# Patient Record
Sex: MALE | Race: WHITE | NOT HISPANIC OR LATINO
[De-identification: names, ages, dates, MRNs, and addresses within clinical notes are randomized per-mention and may not be internally consistent; named-entity substitution may affect disease eponyms.]

---
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
## 2019-09-19 ENCOUNTER — EMERGENCY (EMERGENCY)
Facility: HOSPITAL | Age: 19
LOS: 0 days | Discharge: HOME | End: 2019-09-19
Attending: EMERGENCY MEDICINE | Admitting: EMERGENCY MEDICINE
Payer: COMMERCIAL

## 2019-09-19 VITALS
TEMPERATURE: 101 F | DIASTOLIC BLOOD PRESSURE: 69 MMHG | RESPIRATION RATE: 18 BRPM | WEIGHT: 210.32 LBS | HEART RATE: 124 BPM | OXYGEN SATURATION: 98 % | SYSTOLIC BLOOD PRESSURE: 127 MMHG

## 2019-09-19 VITALS — TEMPERATURE: 100 F | HEART RATE: 93 BPM

## 2019-09-19 DIAGNOSIS — B34.9 VIRAL INFECTION, UNSPECIFIED: ICD-10-CM

## 2019-09-19 DIAGNOSIS — R50.9 FEVER, UNSPECIFIED: ICD-10-CM

## 2019-09-19 PROCEDURE — 99283 EMERGENCY DEPT VISIT LOW MDM: CPT

## 2019-09-19 RX ORDER — ONDANSETRON 8 MG/1
8 TABLET, FILM COATED ORAL ONCE
Refills: 0 | Status: COMPLETED | OUTPATIENT
Start: 2019-09-19 | End: 2019-09-19

## 2019-09-19 RX ORDER — ACETAMINOPHEN 500 MG
650 TABLET ORAL ONCE
Refills: 0 | Status: COMPLETED | OUTPATIENT
Start: 2019-09-19 | End: 2019-09-19

## 2019-09-19 RX ADMIN — Medication 650 MILLIGRAM(S): at 21:19

## 2019-09-19 RX ADMIN — ONDANSETRON 8 MILLIGRAM(S): 8 TABLET, FILM COATED ORAL at 21:19

## 2019-09-19 NOTE — ED PROVIDER NOTE - OBJECTIVE STATEMENT
19yr male with two days of feeling malaise fever muscle pain had nausea and diarrhea now just no nausea no abd pain no headache no neck pain no urinary symptoms no medical issues except hypothyroidism gets synthroid 100mcg daily last motrin 400mg at 7:30pm

## 2019-09-19 NOTE — ED PROVIDER NOTE - CLINICAL SUMMARY MEDICAL DECISION MAKING FREE TEXT BOX
19yr male with two days of malaise fever nausea no respiratory distress no chest pain got tylenol and zofran vs stable    diagnosis viral syndrome follow up with pmd  ED evaluation and management discussed with the parent of the patient in detail.  Close PMD follow up encouraged.  Strict ED return instructions discussed in detail and parent was given the opportunity to ask any questions about their discharge diagnosis and instructions. Patient parent verbalized understanding.

## 2019-09-19 NOTE — ED PEDIATRIC TRIAGE NOTE - CHIEF COMPLAINT QUOTE
pt c/o fever x2 days   reports chest tightness and intermittent nausea, vomiting x3 yesterday. pt c/o fever and cough x2 days   reports chest tightness and intermittent nausea, vomiting x3 yesterday.

## 2019-09-19 NOTE — ED PEDIATRIC NURSE NOTE - CHIEF COMPLAINT QUOTE
pt c/o fever and cough x2 days   reports chest tightness and intermittent nausea, vomiting x3 yesterday.

## 2019-09-19 NOTE — ED PROVIDER NOTE - PHYSICAL EXAMINATION
VS reviewed, stable.  Gen: interactive, well appearing, no acute distress  HEENT: NC/AT,  right TM  non bulging  left tm,  no evidence of mastoiditis,  moist mucus membranes, pupils equal, responsive, reactive to light and accomodation, no conjunctivitis or scleral icterus; no nasal discharge .   OP no exudates no erythema  Neck: FROM, supple, no cervical LAD  Chest: CTA b/l, no crackles/wheezes, good air entry, no tachypnea or retractions  CV: regular rate and rhythm, no murmurs   Abd: soft, nontender, nondistended, no HSM appreciated, +BS

## 2019-09-19 NOTE — ED PROVIDER NOTE - NS ED ROS FT
Constitutional: (+) fever  Eyes: (-) redness  ENT: (-) ear pain, (-) nasal congestions, (-) sore throat   Cardiovascular: (-) syncope  Respiratory: (-) cough, (-) shortness of breath  Gastrointestinal: (-) vomiting, (-) diarrhea, (-)nausea  Musculoskeletal: (-) neck pain  Integumentary: (-) rash  Neurological: (-) headache  :  (-)dysuria  Allergic/Immunologic: (-) pruritus  +muscle pain

## 2019-09-19 NOTE — ED PROVIDER NOTE - PATIENT PORTAL LINK FT
You can access the FollowMyHealth Patient Portal offered by [facility] by registering at the following website: [email or web address]. By joining [facility]’s FollowMyHealth portal, you will also be able to view your health information using other applications (apps) compatible with our system.

## 2019-09-22 ENCOUNTER — INPATIENT (INPATIENT)
Facility: HOSPITAL | Age: 19
LOS: 1 days | Discharge: HOME | End: 2019-09-24
Attending: INTERNAL MEDICINE | Admitting: INTERNAL MEDICINE
Payer: COMMERCIAL

## 2019-09-22 ENCOUNTER — TRANSCRIPTION ENCOUNTER (OUTPATIENT)
Age: 19
End: 2019-09-22

## 2019-09-22 VITALS
DIASTOLIC BLOOD PRESSURE: 73 MMHG | OXYGEN SATURATION: 98 % | HEART RATE: 112 BPM | SYSTOLIC BLOOD PRESSURE: 138 MMHG | TEMPERATURE: 100 F | WEIGHT: 206.35 LBS | RESPIRATION RATE: 20 BRPM

## 2019-09-22 LAB
ALBUMIN SERPL ELPH-MCNC: 4 G/DL — SIGNIFICANT CHANGE UP (ref 3.5–5.2)
ALP SERPL-CCNC: 79 U/L — SIGNIFICANT CHANGE UP (ref 30–115)
ALT FLD-CCNC: 33 U/L — SIGNIFICANT CHANGE UP (ref 13–38)
ANION GAP SERPL CALC-SCNC: 20 MMOL/L — HIGH (ref 7–14)
APPEARANCE UR: CLEAR — SIGNIFICANT CHANGE UP
AST SERPL-CCNC: 44 U/L — HIGH (ref 13–38)
BACTERIA # UR AUTO: NEGATIVE — SIGNIFICANT CHANGE UP
BASE EXCESS BLDV CALC-SCNC: 1.3 MMOL/L — SIGNIFICANT CHANGE UP (ref -2–2)
BASOPHILS # BLD AUTO: 0.03 K/UL — SIGNIFICANT CHANGE UP (ref 0–0.2)
BASOPHILS NFR BLD AUTO: 0.3 % — SIGNIFICANT CHANGE UP (ref 0–1)
BILIRUB SERPL-MCNC: 1.3 MG/DL — HIGH (ref 0.2–1.2)
BILIRUB UR-MCNC: ABNORMAL
BUN SERPL-MCNC: 14 MG/DL — SIGNIFICANT CHANGE UP (ref 10–20)
CA-I SERPL-SCNC: 1.2 MMOL/L — SIGNIFICANT CHANGE UP (ref 1.12–1.3)
CALCIUM SERPL-MCNC: 9.9 MG/DL — SIGNIFICANT CHANGE UP (ref 8.5–10.1)
CHLORIDE SERPL-SCNC: 94 MMOL/L — LOW (ref 98–110)
CK SERPL-CCNC: 82 U/L — SIGNIFICANT CHANGE UP (ref 0–225)
CO2 SERPL-SCNC: 20 MMOL/L — SIGNIFICANT CHANGE UP (ref 17–32)
COLOR SPEC: ABNORMAL
CREAT SERPL-MCNC: 1.1 MG/DL — SIGNIFICANT CHANGE UP (ref 0.7–1.5)
DIFF PNL FLD: NEGATIVE — SIGNIFICANT CHANGE UP
EOSINOPHIL # BLD AUTO: 0.02 K/UL — SIGNIFICANT CHANGE UP (ref 0–0.7)
EOSINOPHIL NFR BLD AUTO: 0.2 % — SIGNIFICANT CHANGE UP (ref 0–8)
EPI CELLS # UR: 4 /HPF — SIGNIFICANT CHANGE UP (ref 0–5)
GAS PNL BLDV: 136 MMOL/L — SIGNIFICANT CHANGE UP (ref 136–145)
GAS PNL BLDV: SIGNIFICANT CHANGE UP
GLUCOSE SERPL-MCNC: 97 MG/DL — SIGNIFICANT CHANGE UP (ref 70–99)
GLUCOSE UR QL: NEGATIVE — SIGNIFICANT CHANGE UP
HCO3 BLDV-SCNC: 27 MMOL/L — SIGNIFICANT CHANGE UP (ref 22–29)
HCT VFR BLD CALC: 42.8 % — SIGNIFICANT CHANGE UP (ref 42–52)
HCT VFR BLDA CALC: 53.5 % — HIGH (ref 34–44)
HGB BLD CALC-MCNC: 17.5 G/DL — SIGNIFICANT CHANGE UP (ref 14–18)
HGB BLD-MCNC: 15.4 G/DL — SIGNIFICANT CHANGE UP (ref 14–18)
HYALINE CASTS # UR AUTO: 15 /LPF — HIGH (ref 0–7)
IMM GRANULOCYTES NFR BLD AUTO: 0.4 % — HIGH (ref 0.1–0.3)
KETONES UR-MCNC: ABNORMAL
LACTATE BLDV-MCNC: 1.6 MMOL/L — SIGNIFICANT CHANGE UP (ref 0.5–1.6)
LEUKOCYTE ESTERASE UR-ACNC: NEGATIVE — SIGNIFICANT CHANGE UP
LIDOCAIN IGE QN: 9 U/L — SIGNIFICANT CHANGE UP (ref 7–60)
LYMPHOCYTES # BLD AUTO: 0.7 K/UL — LOW (ref 1.2–3.4)
LYMPHOCYTES # BLD AUTO: 6 % — LOW (ref 20.5–51.1)
MCHC RBC-ENTMCNC: 30.7 PG — SIGNIFICANT CHANGE UP (ref 27–31)
MCHC RBC-ENTMCNC: 36 G/DL — SIGNIFICANT CHANGE UP (ref 32–37)
MCV RBC AUTO: 85.3 FL — SIGNIFICANT CHANGE UP (ref 80–94)
MONOCYTES # BLD AUTO: 0.39 K/UL — SIGNIFICANT CHANGE UP (ref 0.1–0.6)
MONOCYTES NFR BLD AUTO: 3.4 % — SIGNIFICANT CHANGE UP (ref 1.7–9.3)
NEUTROPHILS # BLD AUTO: 10.43 K/UL — HIGH (ref 1.4–6.5)
NEUTROPHILS NFR BLD AUTO: 89.7 % — HIGH (ref 42.2–75.2)
NITRITE UR-MCNC: NEGATIVE — SIGNIFICANT CHANGE UP
NRBC # BLD: 0 /100 WBCS — SIGNIFICANT CHANGE UP (ref 0–0)
PCO2 BLDV: 44 MMHG — SIGNIFICANT CHANGE UP (ref 41–51)
PH BLDV: 7.4 — SIGNIFICANT CHANGE UP (ref 7.26–7.43)
PH UR: 6.5 — SIGNIFICANT CHANGE UP (ref 5–8)
PLATELET # BLD AUTO: 271 K/UL — SIGNIFICANT CHANGE UP (ref 130–400)
PO2 BLDV: 25 MMHG — SIGNIFICANT CHANGE UP (ref 20–40)
POTASSIUM BLDV-SCNC: 3.6 MMOL/L — SIGNIFICANT CHANGE UP (ref 3.3–5.6)
POTASSIUM SERPL-MCNC: 4.7 MMOL/L — SIGNIFICANT CHANGE UP (ref 3.5–5)
POTASSIUM SERPL-SCNC: 4.7 MMOL/L — SIGNIFICANT CHANGE UP (ref 3.5–5)
PROT SERPL-MCNC: 7.5 G/DL — SIGNIFICANT CHANGE UP (ref 6–8)
PROT UR-MCNC: ABNORMAL
RBC # BLD: 5.02 M/UL — SIGNIFICANT CHANGE UP (ref 4.7–6.1)
RBC # FLD: 12.2 % — SIGNIFICANT CHANGE UP (ref 11.5–14.5)
RBC CASTS # UR COMP ASSIST: 5 /HPF — HIGH (ref 0–4)
SAO2 % BLDV: 44 % — SIGNIFICANT CHANGE UP
SODIUM SERPL-SCNC: 134 MMOL/L — LOW (ref 135–146)
SP GR SPEC: 1.04 — HIGH (ref 1.01–1.02)
UROBILINOGEN FLD QL: ABNORMAL
WBC # BLD: 11.62 K/UL — HIGH (ref 4.8–10.8)
WBC # FLD AUTO: 11.62 K/UL — HIGH (ref 4.8–10.8)
WBC UR QL: 6 /HPF — HIGH (ref 0–5)

## 2019-09-22 PROCEDURE — 93010 ELECTROCARDIOGRAM REPORT: CPT

## 2019-09-22 PROCEDURE — 99285 EMERGENCY DEPT VISIT HI MDM: CPT | Mod: 25

## 2019-09-22 RX ORDER — ONDANSETRON 8 MG/1
4 TABLET, FILM COATED ORAL ONCE
Refills: 0 | Status: COMPLETED | OUTPATIENT
Start: 2019-09-22 | End: 2019-09-22

## 2019-09-22 RX ORDER — FAMOTIDINE 10 MG/ML
20 INJECTION INTRAVENOUS ONCE
Refills: 0 | Status: COMPLETED | OUTPATIENT
Start: 2019-09-22 | End: 2019-09-22

## 2019-09-22 RX ORDER — ACETAMINOPHEN 500 MG
975 TABLET ORAL ONCE
Refills: 0 | Status: COMPLETED | OUTPATIENT
Start: 2019-09-22 | End: 2019-09-22

## 2019-09-22 RX ORDER — FAMOTIDINE 10 MG/ML
20 INJECTION INTRAVENOUS ONCE
Refills: 0 | Status: DISCONTINUED | OUTPATIENT
Start: 2019-09-22 | End: 2019-09-22

## 2019-09-22 RX ORDER — SODIUM CHLORIDE 9 MG/ML
2000 INJECTION, SOLUTION INTRAVENOUS ONCE
Refills: 0 | Status: COMPLETED | OUTPATIENT
Start: 2019-09-22 | End: 2019-09-22

## 2019-09-22 RX ADMIN — FAMOTIDINE 104 MILLIGRAM(S): 10 INJECTION INTRAVENOUS at 23:26

## 2019-09-22 RX ADMIN — Medication 975 MILLIGRAM(S): at 22:04

## 2019-09-22 RX ADMIN — ONDANSETRON 4 MILLIGRAM(S): 8 TABLET, FILM COATED ORAL at 21:57

## 2019-09-22 RX ADMIN — SODIUM CHLORIDE 2000 MILLILITER(S): 9 INJECTION, SOLUTION INTRAVENOUS at 21:57

## 2019-09-22 RX ADMIN — Medication 30 MILLILITER(S): at 21:59

## 2019-09-22 NOTE — ED PROVIDER NOTE - OBJECTIVE STATEMENT
20 y/o male with pmhx of hypothyroidism presents with fever for 6 days. Patient states he has been having continuous fevers, vomiting, diarrhea, sob, and chest pain for the past 6 days. Also admits to cough and epigastric abdominal pain. Patient was seen in ER couple of days ago, was advised to follow up with PMD. Patient states he saw Dr. Rodriguez yesterday, who gave him zofran and advised him to come to ER for CXR for concerns for vaping related injury. Patient states he has been vaping for many years now, admits to both THC and nicotine vapes everyday. Denies neck pain, altered mental status, mylagias, IVDA, leg swelling, hx of PE/DVT.

## 2019-09-22 NOTE — ED PROVIDER NOTE - PHYSICAL EXAMINATION
CONSTITUTIONAL: Well-developed; well-nourished; diaphoretic appearing young male   SKIN: warm, dry  HEAD: Normocephalic; atraumatic.  EYES: no conj injection  ENT: No nasal discharge; airway clear.  NECK: Supple; non tender.  CARD: S1, S2 normal; no murmurs, gallops, or rubs. Regular rate and rhythm.   RESP: No wheezes, rales or rhonchi. speaking in full sentences, regular respiratory rate   ABD: soft ntnd  EXT: Normal ROM.  No clubbing, cyanosis or edema.   NEURO: Alert, oriented, grossly unremarkable  PSYCH: Cooperative, appropriate.

## 2019-09-22 NOTE — ED PEDIATRIC TRIAGE NOTE - CHIEF COMPLAINT QUOTE
pt was recently seen in ED for vomiting and abdominal pain.   pt returned to ED today for continued vomiting, unable to tolerate PO intake. fevers at home, diaphoresis.

## 2019-09-22 NOTE — ED PROVIDER NOTE - NS ED ROS FT
Constitutional: See HPI.  Eyes: No visual changes, eye pain or discharge.  ENMT: No hearing changes, pain, discharge or infections. No neck pain or stiffness.  Cardiac: + chest pain, SOB; No edema. No chest pain with exertion.  Respiratory: + cough; No respiratory distress.   GI: + nausea, vomiting, diarrhea and abdominal pain.  : No dysuria, frequency or burning.  MS: No myalgia, muscle weakness, joint pain or back pain.  Neuro: No headache or weakness. No LOC.  Skin: No skin rash.  Endo: No hx of DM, thyroid disease  Except as documented in HPI, all other review of systems is negative

## 2019-09-22 NOTE — ED PROVIDER NOTE - CLINICAL SUMMARY MEDICAL DECISION MAKING FREE TEXT BOX
19yoM previously healthy, vapes nicotine and THC regularly x years, presents with cough, fever, and chest heaviness that started 6 days ago and has been present throughout. Chest is worsened by deep inhalations. Associated feeling of nausea and epigastric tenderness that started the next day, with NBNB emesis. Associated nonbloody diarrhea. Denies leg pain or swelling, recent long distance travel, hormone use, hematochezia, dysuria, or any other symptoms. No FHx of cardiac dz or sudden death. On exam, febrile, hemodynamically stable, saturating well on RA, NAD, appears ill but nontoxic, no increased WOB, head NCAT, neck supple, full ROM, EOMI grossly, anicteric, MMM, uvula midline, RRR, nml S1/S2, no m/r/g, lungs CTAB, no w/r/r, abd soft, NT, ND, nml BS, no rebound or guarding or Lisa's, no hepatosplenomegaly, alert, CN's 3-12 grossly intact, PRESSLEY spontaneously, <2 sec cap refill, skin warm, well perfused, no rashes or hives. No e/o fluid overload or signs of myocarditis. No risk factors for PE. Character low suspicion for ACS. Abdomen benign with low suspicion for acute process. Concern for vaping-associated lung injury, confirmed on CT. Hydrated, given abx and prednisone. Patient NAD, hemodynamically stable, no desats or WOB. Admitted for further monitoring, w/u, and care. 19yoM previously healthy, vapes nicotine and THC regularly x years, presents with cough, fever, and chest heaviness that started 6 days ago and has been present throughout. Chest is worsened by deep inhalations. Associated feeling of nausea and epigastric tenderness that started the next day, with NBNB emesis. Associated nonbloody diarrhea. Denies leg pain or swelling, recent long distance travel, hormone use, hematochezia, dysuria, or any other symptoms. No FHx of cardiac dz or sudden death. On exam, febrile, hemodynamically stable, saturating well on RA, NAD, appears ill but nontoxic, no increased WOB, head NCAT, neck supple, full ROM, EOMI grossly, anicteric, MMM, uvula midline, RRR, nml S1/S2, no m/r/g, lungs CTAB, no w/r/r, abd soft, NT, ND, nml BS, no rebound or guarding or Lisa's, no hepatosplenomegaly, alert, CN's 3-12 grossly intact, PRESSLEY spontaneously, <2 sec cap refill, skin warm, well perfused, no rashes or hives. No e/o fluid overload or signs of myocarditis. No risk factors for PE. Character low suspicion for ACS. Abdomen benign with low suspicion for acute process. Concern for vaping-associated lung injury, confirmed on CT. Hydrated, given abx and prednisone. Call out to tox. Patient NAD, hemodynamically stable, no desats or WOB. Admitted for further monitoring, w/u, and care. 19yoM previously healthy, vapes nicotine and THC regularly x years, presents with cough, fever, and chest heaviness that started 6 days ago and has been present throughout. Chest is worsened by deep inhalations. Associated feeling of nausea and epigastric tenderness that started the next day, with NBNB emesis. Associated nonbloody diarrhea. Denies leg pain or swelling, recent long distance travel, hormone use, hematochezia, dysuria, or any other symptoms. No FHx of cardiac dz or sudden death. On exam, febrile, hemodynamically stable, saturating well on RA, NAD, appears ill but nontoxic, no increased WOB, head NCAT, neck supple, full ROM, EOMI grossly, anicteric, MMM, uvula midline, RRR, nml S1/S2, no m/r/g, lungs CTAB, no w/r/r, abd soft, NT, ND, nml BS, no rebound or guarding or Lisa's, no hepatosplenomegaly, alert, CN's 3-12 grossly intact, PRESSLEY spontaneously, <2 sec cap refill, skin warm, well perfused, no rashes or hives. No e/o fluid overload or signs of myocarditis. No risk factors for PE. Character low suspicion for ACS. Abdomen benign with low suspicion for acute process. Concern for vaping-associated lung injury, confirmed on CT. Hydrated, given abx and prednisone. Call out to tox. Patient NAD, hemodynamically stable, no desats or WOB. Sent to obs per tox for further monitoring.

## 2019-09-22 NOTE — ED PROVIDER NOTE - ATTENDING CONTRIBUTION TO CARE
19yoM previously healthy, vapes nicotine and THC regularly x years, presents with cough, fever, and chest heaviness that started 6 days ago and has been present throughout. Chest is worsened by deep inhalations. Associated feeling of nausea and epigastric tenderness that started the next day, with NBNB emesis. Associated nonbloody diarrhea. Denies leg pain or swelling, recent long distance travel, hormone use, hematochezia, dysuria, or any other symptoms. No FHx of cardiac dz or sudden death. On exam, febrile, hemodynamically stable, saturating well on RA, NAD, appears ill but nontoxic, no increased WOB, head NCAT, neck supple, full ROM, EOMI grossly, anicteric, MMM, uvula midline, RRR, nml S1/S2, no m/r/g, lungs CTAB, no w/r/r, abd soft, NT, ND, nml BS, no rebound or guarding or Lisa's, no hepatosplenomegaly, alert, CN's 3-12 grossly intact, PRESSLEY spontaneously, <2 sec cap refill, skin warm, well perfused, no rashes or hives. No e/o fluid overload or signs of myocarditis. No risk factors for PE. Character low suspicion for ACS. Abdomen benign with low suspicion for acute process. Concern for vaping-associated lung injury though no current lung findings or WOB or desats. NAD, hemodynamically stable. Will obtain CT. Hydrated. Signed off care to Dr. SUHAIL Cotton who will f/u CT's and reassess. 19yoM previously healthy, vapes nicotine and THC regularly x years, presents with cough, fever, and chest heaviness that started 6 days ago and has been present throughout. Chest is worsened by deep inhalations. Associated feeling of nausea and epigastric tenderness that started the next day, with NBNB emesis. Associated nonbloody diarrhea. Denies leg pain or swelling, recent long distance travel, hormone use, hematochezia, dysuria, or any other symptoms. No FHx of cardiac dz or sudden death. On exam, febrile, hemodynamically stable, saturating well on RA, NAD, appears ill but nontoxic, no increased WOB, head NCAT, neck supple, full ROM, EOMI grossly, anicteric, MMM, uvula midline, RRR, nml S1/S2, no m/r/g, lungs CTAB, no w/r/r, abd soft, NT, ND, nml BS, no rebound or guarding or Lisa's, no hepatosplenomegaly, alert, CN's 3-12 grossly intact, PRESSLEY spontaneously, <2 sec cap refill, skin warm, well perfused, no rashes or hives. No e/o fluid overload or signs of myocarditis. No risk factors for PE. Character low suspicion for ACS. Abdomen benign with low suspicion for acute process. Concern for vaping-associated lung injury, confirmed on CT. Hydrated, given abx and prednisone. Patient NAD, hemodynamically stable, no desats or WOB. Admitted for further monitoring, w/u, and care. 19yoM previously healthy, vapes nicotine and THC regularly x years, presents with cough, fever, and chest heaviness that started 6 days ago and has been present throughout. Chest is worsened by deep inhalations. Associated feeling of nausea and epigastric tenderness that started the next day, with NBNB emesis. Associated nonbloody diarrhea. Denies leg pain or swelling, recent long distance travel, hormone use, hematochezia, dysuria, or any other symptoms. No FHx of cardiac dz or sudden death. On exam, febrile, hemodynamically stable, saturating well on RA, NAD, appears ill but nontoxic, no increased WOB, head NCAT, neck supple, full ROM, EOMI grossly, anicteric, MMM, uvula midline, RRR, nml S1/S2, no m/r/g, lungs CTAB, no w/r/r, abd soft, NT, ND, nml BS, no rebound or guarding or Lisa's, no hepatosplenomegaly, alert, CN's 3-12 grossly intact, PRESSLEY spontaneously, <2 sec cap refill, skin warm, well perfused, no rashes or hives. No e/o fluid overload or signs of myocarditis. No risk factors for PE. Character low suspicion for ACS. Abdomen benign with low suspicion for acute process. Concern for vaping-associated lung injury, confirmed on CT. Hydrated, given abx and prednisone. Call out to tox. Patient NAD, hemodynamically stable, no desats or WOB. Admitted for further monitoring, w/u, and care. 19yoM previously healthy, vapes nicotine and THC regularly x years, presents with cough, fever, and chest heaviness that started 6 days ago and has been present throughout. Chest is worsened by deep inhalations. Associated feeling of nausea and epigastric tenderness that started the next day, with NBNB emesis. Associated nonbloody diarrhea. Denies leg pain or swelling, recent long distance travel, hormone use, hematochezia, dysuria, or any other symptoms. No FHx of cardiac dz or sudden death. On exam, febrile, hemodynamically stable, saturating well on RA, NAD, appears ill but nontoxic, no increased WOB, head NCAT, neck supple, full ROM, EOMI grossly, anicteric, MMM, uvula midline, RRR, nml S1/S2, no m/r/g, lungs CTAB, no w/r/r, abd soft, NT, ND, nml BS, no rebound or guarding or Lisa's, no hepatosplenomegaly, alert, CN's 3-12 grossly intact, PRESSLEY spontaneously, <2 sec cap refill, skin warm, well perfused, no rashes or hives. No e/o fluid overload or signs of myocarditis. No risk factors for PE. Character low suspicion for ACS. Abdomen benign with low suspicion for acute process. Concern for vaping-associated lung injury, confirmed on CT. Hydrated, given abx and prednisone. Call out to tox. Patient NAD, hemodynamically stable, no desats or WOB. Sent to obs per tox for further monitoring.

## 2019-09-23 LAB — TROPONIN T SERPL-MCNC: <0.01 NG/ML — SIGNIFICANT CHANGE UP

## 2019-09-23 PROCEDURE — 74177 CT ABD & PELVIS W/CONTRAST: CPT | Mod: 26

## 2019-09-23 PROCEDURE — 99285 EMERGENCY DEPT VISIT HI MDM: CPT

## 2019-09-23 PROCEDURE — 71275 CT ANGIOGRAPHY CHEST: CPT | Mod: 26

## 2019-09-23 PROCEDURE — 99234 HOSP IP/OBS SM DT SF/LOW 45: CPT

## 2019-09-23 RX ORDER — ONDANSETRON 8 MG/1
4 TABLET, FILM COATED ORAL ONCE
Refills: 0 | Status: COMPLETED | OUTPATIENT
Start: 2019-09-23 | End: 2019-09-23

## 2019-09-23 RX ORDER — INFLUENZA VIRUS VACCINE 15; 15; 15; 15 UG/.5ML; UG/.5ML; UG/.5ML; UG/.5ML
0.5 SUSPENSION INTRAMUSCULAR ONCE
Refills: 0 | Status: DISCONTINUED | OUTPATIENT
Start: 2019-09-23 | End: 2019-09-24

## 2019-09-23 RX ORDER — SODIUM CHLORIDE 9 MG/ML
1000 INJECTION INTRAMUSCULAR; INTRAVENOUS; SUBCUTANEOUS ONCE
Refills: 0 | Status: COMPLETED | OUTPATIENT
Start: 2019-09-23 | End: 2019-09-23

## 2019-09-23 RX ORDER — AZITHROMYCIN 500 MG/1
500 TABLET, FILM COATED ORAL ONCE
Refills: 0 | Status: COMPLETED | OUTPATIENT
Start: 2019-09-23 | End: 2019-09-23

## 2019-09-23 RX ORDER — SODIUM CHLORIDE 9 MG/ML
1000 INJECTION, SOLUTION INTRAVENOUS ONCE
Refills: 0 | Status: COMPLETED | OUTPATIENT
Start: 2019-09-23 | End: 2019-09-23

## 2019-09-23 RX ORDER — CEFTRIAXONE 500 MG/1
1000 INJECTION, POWDER, FOR SOLUTION INTRAMUSCULAR; INTRAVENOUS ONCE
Refills: 0 | Status: COMPLETED | OUTPATIENT
Start: 2019-09-23 | End: 2019-09-23

## 2019-09-23 RX ORDER — PANTOPRAZOLE SODIUM 20 MG/1
40 TABLET, DELAYED RELEASE ORAL ONCE
Refills: 0 | Status: COMPLETED | OUTPATIENT
Start: 2019-09-23 | End: 2019-09-23

## 2019-09-23 RX ADMIN — AZITHROMYCIN 255 MILLIGRAM(S): 500 TABLET, FILM COATED ORAL at 01:17

## 2019-09-23 RX ADMIN — ONDANSETRON 4 MILLIGRAM(S): 8 TABLET, FILM COATED ORAL at 08:54

## 2019-09-23 RX ADMIN — Medication 125 MILLIGRAM(S): at 08:54

## 2019-09-23 RX ADMIN — PANTOPRAZOLE SODIUM 40 MILLIGRAM(S): 20 TABLET, DELAYED RELEASE ORAL at 10:54

## 2019-09-23 RX ADMIN — Medication 60 MILLIGRAM(S): at 01:19

## 2019-09-23 RX ADMIN — CEFTRIAXONE 100 MILLIGRAM(S): 500 INJECTION, POWDER, FOR SOLUTION INTRAMUSCULAR; INTRAVENOUS at 01:18

## 2019-09-23 RX ADMIN — SODIUM CHLORIDE 500 MILLILITER(S): 9 INJECTION INTRAMUSCULAR; INTRAVENOUS; SUBCUTANEOUS at 02:01

## 2019-09-23 NOTE — ED CDU PROVIDER INITIAL DAY NOTE - MEDICAL DECISION MAKING DETAILS
Seen and examined on AM rounds.  No acute distress.  Afebrile, non toxic.  Spoke with Toxicology attending.  Will continue steroids, observation for desaturation and Pulmonary Consult.  Pulmonary fellow aware.

## 2019-09-23 NOTE — ED CDU PROVIDER INITIAL DAY NOTE - PHYSICAL EXAMINATION
Constitutional: Well developed, well nourished. NAD  Head: Normocephalic, atraumatic.  Eyes: PERRL, EOMI.  ENT: No nasal discharge. Mucous membranes dry.  Neck: Supple. Painless ROM.  Cardiovascular:  Regular rate and rhythm. + tachycardia  Pulmonary: Lungs clear to auscultation bilaterally.   Abdominal: Soft. Nondistended. No rebound, guarding, rigidity.  Extremities. Pelvis stable. No lower extremity edema, symmetric calves.  Skin: No rashes, cyanosis.  Neuro: AAOx3. No focal neurological deficits.  Psych: Normal mood. Normal affect.

## 2019-09-23 NOTE — ED CDU PROVIDER INITIAL DAY NOTE - OBJECTIVE STATEMENT
19 yold male to ED Pmhx Hypothyroidism c/o n/v/diarrhea, cough, pleuritic cp, fever tmax 102 x 1 week ago; pt admits to using nicotine and THC vape pens - Last used 5 days ago; pt seen here initially dx viral syndrome; pt seen by pmd instructed to return to Ed if sx not improved for possible vape injury; ct done in ED showed ground glass opacities bilat - Pneumonia v. vaping lung inj; case d/w tox who recommend obs stay; pt given abx, steroids and Iv fluids

## 2019-09-23 NOTE — CONSULT NOTE ADULT - ASSESSMENT
Impression:    Vaping-induced lung injury      Plan:  Check rvp/urine strept/urine legionella  oxygen prn  IV hydration  no abx for now  steroid 60 mg q24  Gi ppx  check pulse ox on exertion  incentive spiromtery  DVT ppx: ambulation Impression:    highly Vaping-induced lung injury  doubt bacterial pneumonia      Plan:    Check rvp/urine strept/urine legionella  procalcitonin  oxygen prn  IV hydration  levaquin 500 q 24h will dc if procalcitonin sl  solumedrol 60 mg q 12  Gi ppx  check pulse ox on exertion  admit for monitoring repeat CXR in am

## 2019-09-23 NOTE — ED CDU PROVIDER INITIAL DAY NOTE - PROGRESS NOTE DETAILS
received signout from Dr. Landis for tox observation - possible vapeing injury; plan: iv abx, steroids, Iv fluids;  received call from RN for IV fluid and antibiotic incompatibility as per pharmacy; no visible precipitation in IV tubing; LR discontinued and pt given NS bolus; case d/w Tox; pt neuro/vascular intact; no worsening sob/wheezing; will continue to monitor; pt seen bedside, NAD, no complaints feeling better, symptoms improved from steroids. pt will be given po steroids and possibly dispo'd home. pulm consult placed, pulmonary team wants to keep patient overnight to further observe and see the most recent labs that were resent out.  Pulmonary team will re-evaluate patient in the AM .will continue to monitor patient.

## 2019-09-23 NOTE — ED PEDIATRIC NURSE NOTE - ED STAT RN HANDOFF DETAILS
pt resting comfortably, denies any discomfort. no distress noted at this time. iv intact, safety maintained, VSS. awaiting additional testing.

## 2019-09-23 NOTE — CONSULT NOTE ADULT - SUBJECTIVE AND OBJECTIVE BOX
Time:19 @ 06:25  [facility] ED  Emergent/Routine    Chief Complaint:    History of Present Illness:  19yMale      Past Medical History:  ^FEVER/VOMITTING  MEWS Score  No pertinent past medical history  Multifocal pneumonia  FEVER/VOMITTING  2  Acute lung injury        Home Medications:      Active Medications:  MEDICATIONS  (STANDING):  ondansetron Injectable 4 milliGRAM(s) IV Push once    MEDICATIONS  (PRN):        Social History:  Tobacco:     EtOH:     Illicit Substances:       Family History:  ???    Review of Systems:  General:   []Fever  []Chills  []Malaise  [Inc/Dec] Appetite  [Inc/Dec] Weight]  Eyes:   []Vision  [][place]  []Pain  []Redness  []Itching  []Diplopia  ENT:   []Hearing  []Tinnitus  []Epistaxis  []Dysphagia  []Sore Throat  CV:   []Chest pain  []Palpitations  []Syncope  []Edema  Resp:   []Dyspnea  []Cough  []Wheezing  []Stridor  []Hemoptysis  GI:   []Pain  []Nausea  []Vomiting  []Diarrhea  []Constipation  []Bleeding  /Gyn:   []Dysuria  []Hematuria  []Incontinence  []Bleeding  []Discharge  []Pain  Ortho:   []Pain  []Swelling  []Myalgia  []Spasm  []Atrophy  Skin:   []Rash  []Lesions  []Itching  []Sweating  Endocrine:   []Hypoglycemia  []Hyperglycemia  []Polydipsia  []Polyuria  [Heat/Cold] Intolerance  Neuro:   []Headache  []Motor  []Sensory  []Speech  []Tremor  []Seizures  []AMS  []LOC  []Dizzy  Psych:   []Depression  []Heide  []Anxiety  []Hallucinations  []Insomnia  []Suicidal  Heme/Immunity:   []Bleeding  []Bruising  []Petechiae  []Adenopathy  []HIV  []Cancer    All other systems negative or non-contributory  Unable to assess due to:       Vital Signs Last 24 Hrs  T(C): 36.8 (23 Sep 2019 06:21), Max: 38 (22 Sep 2019 20:31)  T(F): 98.3 (23 Sep 2019 06:21), Max: 100.4 (22 Sep 2019 20:31)  HR: 94 (23 Sep 2019 06:21) (94 - 112)  BP: 133/73 (23 Sep 2019 06:21) (133/73 - 138/73)  BP(mean): --  RR: 18 (23 Sep 2019 06:21) (18 - 20)  SpO2: 98% (23 Sep 2019 06:21) (97% - 98%)  CAPILLARY BLOOD GLUCOSE          Physical Exam:  General:   Alert/Unresponsive, WDWN, NAD, Distress [], Thin/Obese, Disheveled, Acute/Chronically Ill, Active/Playful/Crying  Head:   NCAT, Deformity, Mass, [place]/Bulging  Eyes:   Visual Fields [], EOM [], []Nystagmus, Pupils []R []L, PERRL, Conjunctiva []  ENT:   Hearing [], Nasal Mucosa [], Lips [], Teeth [], Gums [], Oropharynx Mucosa [], Pharynx []  Neck:   []Supple, []Mass, []Meningismus, []JVD, []Crepitus  CV:   []Regular Rate, []Regular Rhythm, []S3-S4, []Rub, []Murmur  Respiratory:   Effort [], Auscultation [Clear/Rales/Wheeze/Rhonchi][location], []Retractions, []Stridor  Abdomen:   Auscultation [], [Flat/Distended/Obese/Gravid], Soft, []Tender, []Guard, []Rebound, Liver [], Spleen [], Back [], Rectum []  Musculoskeletal:   Gait [], Extremities [Edema/Cyanosis/Tremor], Tone [], Digits/Nails [Clubbing/Cyanosis/Petechiae/Swelling/Ishemia]]  Skin:   []Erythema, []Rash, []Ulcer, []Jaundice, []Cyanosis, [Warm/Cool], [Dry/Moist/Diaphoretic]  Lymphatic:   [Normal], [Adenopathy - location]   :   Normal/Tender/Mass, []Discharge, []Bleeding  Psychiatric:   Insight/Judgement [], Orientation [], Memory [], Affect [Appropriate/Depressed/Anxious/Agitated/Hostile]], Thought [Suicidal/Homicidal/Hallucinations/Delusions]]  Neurological:   [CN 2-12 intact], II [normal/field cut/blind], III/IV/VI [normal/pupil deficit/EOM palsy], V [normal/facial sensation], VII [normal/facial asymmetry], VIII [normal/hearing/vertigo], IX/X [normal/gag reflex], XI [normal/trapezius], XII [normal/tongue deviation], Motor [], Sensory [], Reflexes [], Coordination [], Myoclonus [], LOC []    GCS:  E:   /4  V:   /5  M:   /6    EKG:  Labs:                        15.4   11.62 )-----------( 271      ( 22 Sep 2019 21:03 )             42.8         134<L>  |  94<L>  |  14  ----------------------------<  97  4.7   |  20  |  1.1    Ca    9.9      22 Sep 2019 21:03    TPro  7.5  /  Alb  4.0  /  TBili  1.3<H>  /  DBili  x   /  AST  44<H>  /  ALT  33  /  AlkPhos  79        Urinalysis Basic - ( 22 Sep 2019 21:03 )    Color: Kristina / Appearance: Clear / S.040 / pH: x  Gluc: x / Ketone: Large  / Bili: Moderate / Urobili: 12 mg/dL   Blood: x / Protein: 300 mg/dL / Nitrite: Negative   Leuk Esterase: Negative / RBC: 5 /HPF / WBC 6 /HPF   Sq Epi: x / Non Sq Epi: 4 /HPF / Bacteria: Negative        CARDIAC MARKERS ( 23 Sep 2019 00:15 )  x     / <0.01 ng/mL / x     / x     / x      CARDIAC MARKERS ( 22 Sep 2019 21:03 )  x     / x     / 82 U/L / x     / x          Aspirin:    Acetaminophen:    Ethanol: Time:19 @ 06:25  [facility] ED  Emergent    Chief Complaint: Abdominal Pain & Fever    History of Present Illness:  19m w a hx of hypothyroid presented to ED w 6 days of fever (Tmax 102), nonproductive cough, dyspnea, pleuritic chest pain, abdominal pain, & vomit/diarrhea. Symptoms are moderate, constant, no exacerbating/alleviating Pt seen in ED for similar a few days prior. Pt admits to vaping both nicotine & THC daily for years w last use 5 days prior. No recent travel/hosp/immobilization.      Past Medical History:  Hypothyroid      Home Medications:      Active Medications:  MEDICATIONS  (STANDING):  ondansetron Injectable 4 milliGRAM(s) IV Push once    MEDICATIONS  (PRN):      Social History:  Tobacco:   +Vape  Illicit Substances:   +THC      Review of Systems:  Constitutional:  +Fever/chills +malaise  Eyes:  Negative.   ENMT:  No nasal congestion, discharge, or throat pain.   Cardiac:  No palpitations, syncope, or edema. +Pleuritic chest pain  Respiratory:  +Dyspnea +cough. No hemoptysis.  GI:  +Vomiting +diarrhea +abdominal pain. No melena or hematochezia.  :  No dysuria or hematuria.   Musculoskeletal:  No gait abnormality, joint swelling, joint pain, or back pain. +Myalgias  Skin:  No skin rash, jaundice, or lesions.  Neuro:  No headache, loss of sensation, or focal weakness.  No change in mental status.       Vital Signs Last 24 Hrs  T(C): 36.8 (23 Sep 2019 06:21), Max: 38 (22 Sep 2019 20:31)  T(F): 98.3 (23 Sep 2019 06:21), Max: 100.4 (22 Sep 2019 20:31)  HR: 94 (23 Sep 2019 06:21) (94 - 112)  BP: 133/73 (23 Sep 2019 06:21) (133/73 - 138/73)  BP(mean): --  RR: 18 (23 Sep 2019 06:21) (18 - 20)  SpO2: 98% (23 Sep 2019 06:21) (97% - 98%)    CAPILLARY BLOOD GLUCOSE      Physical Exam:  General: Awake, alert, NAD, WDWN, non-toxic appearing, NCAT  Eyes: PERRL, EOMI, no icterus, lids and conjunctivae are normal  ENT: External inspection normal, pink/dry membranes, pharynx normal, no pharyngeal erythema/exudate  CV: S1S2, regular rate and rhythm, no murmur/gallops/rubs, no JVD, 2+ pulses b/l, no edema/cords/homans, warm/well-perfused  Respiratory: Normal respiratory rate/effort, no respiratory distress, normal voice, speaking full sentences, lungs clear to auscultation b/l, no wheezing/rales/rhonchi, no retractions, no stridor  Abdomen: Soft abdomen, no tender/distended/guarding/rebound, no CVA tender  Musculoskeletal: FROM all 4 extremities, N/V intact, normal tone, stable gait  Neck: FROM neck, supple, no meningismus, trachea midline, no JVD  Integumentary: Color normal for race, warm and dry, no rash  Neuro: Oriented x3, CN 2-12 intact, normal motor, normal sensory, normal gait  Psych: Oriented x3, mood normal, affect normal     GCS:  E:   4/4  V:   5/5  M:   6/6    EKG: Sinus @82 QRS/QTC: 82/408  Labs:                        15.4   11.62 )-----------( 271      ( 22 Sep 2019 21:03 )             42.8     09-    134<L>  |  94<L>  |  14  ----------------------------<  97  4.7   |  20  |  1.1    Ca    9.9      22 Sep 2019 21:03    TPro  7.5  /  Alb  4.0  /  TBili  1.3<H>  /  DBili  x   /  AST  44<H>  /  ALT  33  /  AlkPhos  79      Blood Gas: 7.40/44  Lactate: 1.6    Urinalysis Basic - ( 22 Sep 2019 21:03 )  Color: Kristina / Appearance: Clear / S.040 / pH: x  Gluc: x / Ketone: Large  / Bili: Moderate / Urobili: 12 mg/dL   Blood: x / Protein: 300 mg/dL / Nitrite: Negative   Leuk Esterase: Negative / RBC: 5 /HPF / WBC 6 /HPF   Sq Epi: x / Non Sq Epi: 4 /HPF / Bacteria: Negative    CARDIAC MARKERS ( 23 Sep 2019 00:15 )  x     / <0.01 ng/mL / x     / x     / x      CARDIAC MARKERS ( 22 Sep 2019 21:03 )  x     / x     / 82 U/L / x     / x          EXAM:  CT ABDOMEN AND PELVIS IC        EXAM:  CT ANGIO CHEST (W)AW IC        PROCEDURE DATE:  2019    INTERPRETATION:  CLINICAL STATEMENT: Shortness of breath. Fever. Vaping   history.  TECHNIQUE: Multislice helical sections were obtained from the thoracic   inlet to the lung bases during rapid administration of 100cc Optiray 320   intravenous contrast using a CTA protocol. Subsequently, axial CT   sections were obtained from the domes of the diaphragms to the pubic   symphysis. Thin sections were reconstructed through the pulmonary   vasculature. Sagittal and coronal reformatted images were acquired, as   well as MIP reconstructed images.  COMPARISON CT: None.  OTHER STUDIES USED FOR CORRELATION: None.  FINDINGS:  CHEST:   PULMONARY EMBOLUS: No central or segmental pulmonary emboli.  LUNGS/PLEURA/AIRWAYS: Diffuse groundglass opacities throughout both lung   fields, upper lobe predominant. No pneumothorax or pleural effusions.   Patent central airways.  MEDIASTINUM/THORACIC NODES: Unremarkable.  HEART/GREAT VESSELS: Heart size is within normal limits. No pericardial   effusion. The thoracic aorta and main pulmonary artery within normal   limits.  ABDOMEN/PELVIS:  HEPATOBILIARY: Unremarkable. The gallbladder is suboptimally evaluated.   No intrahepatic or extra hepatic biliary ductal dilation.  SPLEEN: Unremarkable.  PANCREAS: Unremarkable.  ADRENAL GLANDS: Unremarkable.  KIDNEYS: Symmetric renal enhancement. No hydronephrosis.  ABDOMINOPELVIC NODES: Unremarkable.  PELVIC ORGANS: Unremarkable.  PERITONEUM/MESENTERY/BOWEL: Sigmoid colon diverticulosis without evidence   of diverticulitis No bowel obstruction. No pneumoperitoneum or ascites.   Unremarkable appendix.  BONES/SOFT TISSUES: Unremarkable.  OTHER: Normal caliber aorta.  IMPRESSION:   Diffuse ground glass opacities throughout both lung fields, upper lobe   predominant. Findings may represent pneumonia or vaping related lung   injury.  Dr. Felice Barrientos discussed preliminary findings with ENRIQUE NICOLAS on   2019 12:33 AM with readback.  FELICE BARRIENTOS M.D., RESIDENT RADIOLOGIST  This document has been electronically signed.  NICOLE WOOTEN M.D., ATTENDING RADIOLOGIST  This document has been electronically signed. Sep 23 2019 12:35AM

## 2019-09-23 NOTE — ED PEDIATRIC NURSE REASSESSMENT NOTE - NS ED NURSE REASSESS COMMENT FT2
IV incompatibility identified between Ceftriaxone and Ringers Lactate. Ringers Lactate discontinued and Normal Saline infused. No precipitate or changes in coloration or viscosity visible in tubing. Pt assessed following Ceftriaxone infusion. No verbalized or observable adverse reactions or distress. INGRID Smith & MD Rosenthal made aware. Assessment continues following Bolus of NS, status unchanged. Will continue to monitor for changes throughout shift.

## 2019-09-23 NOTE — CONSULT NOTE ADULT - ASSESSMENT
19m w 6 days of fever, nonproductive cough, pleuritic chest pain, & vomit/diarrhea. Pt use of nicotine & THC vape. CT performed showing b/l ground glass opacities. Pt nontoxic appearing, n/v intact, no resp distress.    --Continue supportive care & monitoring.   --Phone consultation w ED recommended giving steroids, NSAIDs as needed for pain, antiemetics, & IV fluids. Please give respiratory/nebulizer treatments as needed for dyspnea.  --Consider alternate etiologies for patient's symptoms.  --Electrolytes, renal function, & LFT's ok. EKG ok  --Will continue to follow. Please call with any further questions or changes in status.    Ariadna    [phone] (pager)

## 2019-09-23 NOTE — ED PEDIATRIC NURSE REASSESSMENT NOTE - NS ED NURSE REASSESS COMMENT FT2
Pt monitored overnight. No observable or verbalized signs of discomfort or distress. Pt resting comfortably in bed, monitor in place, family at bedside, call bell within reach.

## 2019-09-23 NOTE — ED CDU PROVIDER DISPOSITION NOTE - ATTENDING CONTRIBUTION TO CARE
pt placed in EDOU for Vape associated lung injury vs multifocal pna seen on CTA. Pt seen by pulm attending Dr Juice Morse who recommends admission for further treatment/observation.

## 2019-09-23 NOTE — CONSULT NOTE ADULT - SUBJECTIVE AND OBJECTIVE BOX
Patient is a 19y old  Male who presents with a chief complaint of SOB    HPI: 18 yo with pmhx hypothyroidism, hx of gastritis on PPi, came in for fever, nonproductive cough, chills, dyspnea on exertion, vomiting and diarrhea of 6 day duration(last tuesday). patient was seen in the ed yesterday /prescribed abx and was sent home on oral abx. Patient denied any sick contact, he used e-cig for years with THC(Liquid form) and nicotine. patient denies any chest pain(only pressure), palpitations, PND, orthopnea.  do report 15 lbs weight loss 2ry to vomiting. patient is sexually active with his girlfriend(-ve for HIV).      PAST MEDICAL & SURGICAL HISTORY:  Hypothroidism on 100 mcg levothyroxine      SOCIAL HX:   Smoking     E-cig/THC liquid                    ETOH                            Other    FAMILY HISTORY:  .  No cardiovascular or pulmonary family history     Review of System:  See HPI    Allergies    No Known Allergies    Intolerances          PHYSICAL EXAM  Vital Signs Last 24 Hrs  T(C): 36.9 (23 Sep 2019 07:55), Max: 38 (22 Sep 2019 20:31)  T(F): 98.4 (23 Sep 2019 07:55), Max: 100.4 (22 Sep 2019 20:31)  HR: 103 (23 Sep 2019 07:55) (94 - 112)  BP: 133/72 (23 Sep 2019 07:55) (133/72 - 138/73)  BP(mean): --  RR: 18 (23 Sep 2019 07:55) (18 - 20)  SpO2: 99% (23 Sep 2019 07:55) (97% - 99%)    General: in no respiratory disrtress, sweating, dehydrated  HEENT: JUAN JOSE             Lymphatic system: No cervical LN     Lungs: Ezio BS  Cardiovascular: Regular  Gastrointestinal: Soft.  + BS   Musculoskeletal: No Clubbing.  Full range of motion.. Moves all extremities  Skin: Warm.  Intact  Neurological: No motor or sensory deficit       LABS:                          15.4   11.62 )-----------( 271      ( 22 Sep 2019 21:03 )             42.8                                               09-    134<L>  |  94<L>  |  14  ----------------------------<  97  4.7   |  20  |  1.1    Ca    9.9      22 Sep 2019 21:03    TPro  7.5  /  Alb  4.0  /  TBili  1.3<H>  /  DBili  x   /  AST  44<H>  /  ALT  33  /  AlkPhos  79                                               Urinalysis Basic - ( 22 Sep 2019 21:03 )    Color: Kristina / Appearance: Clear / S.040 / pH: x  Gluc: x / Ketone: Large  / Bili: Moderate / Urobili: 12 mg/dL   Blood: x / Protein: 300 mg/dL / Nitrite: Negative   Leuk Esterase: Negative / RBC: 5 /HPF / WBC 6 /HPF   Sq Epi: x / Non Sq Epi: 4 /HPF / Bacteria: Negative        CARDIAC MARKERS ( 23 Sep 2019 00:15 )  x     / <0.01 ng/mL / x     / x     / x      CARDIAC MARKERS ( 22 Sep 2019 21:03 )  x     / x     / 82 U/L / x     / x                                                LIVER FUNCTIONS - ( 22 Sep 2019 21:03 )  Alb: 4.0 g/dL / Pro: 7.5 g/dL / ALK PHOS: 79 U/L / ALT: 33 U/L / AST: 44 U/L / GGT: x                                                                                                MEDICATIONS  (STANDING):    MEDICATIONS  (PRN):    Imaging:  < from: CT Abdomen and Pelvis w/ IV Cont (19 @ 00:20) >  CHEST:     PULMONARY EMBOLUS: No central or segmental pulmonary emboli.    LUNGS/PLEURA/AIRWAYS: Diffuse groundglass opacities throughout both lung   fields, upper lobe predominant. No pneumothorax or pleural effusions.   Patent central airways.    MEDIASTINUM/THORACIC NODES: Unremarkable.    HEART/GREAT VESSELS: Heart size is within normal limits. No pericardial   effusion. The thoracic aorta and main pulmonary artery within normal   limits.      ABDOMEN/PELVIS:    HEPATOBILIARY: Unremarkable. The gallbladder is suboptimally evaluated.   No intrahepatic or extra hepatic biliary ductal dilation.    SPLEEN: Unremarkable.    PANCREAS: Unremarkable.    ADRENAL GLANDS: Unremarkable.    KIDNEYS: Symmetric renal enhancement. No hydronephrosis.    ABDOMINOPELVIC NODES: Unremarkable.    PELVIC ORGANS: Unremarkable.    PERITONEUM/MESENTERY/BOWEL: Sigmoid colon diverticulosis without evidence   of diverticulitis No bowel obstruction. No pneumoperitoneum or ascites.   Unremarkable appendix.    BONES/SOFT TISSUES: Unremarkable.    OTHER: Normal caliber aorta.      IMPRESSION:       Diffuse ground glass opacities throughout both lung fields, upper lobe   predominant. Findings may represent pneumonia or vaping related lung   injury.    < end of copied text > Patient is a 19y old  Male who presents with a chief complaint of SOB    HPI: 20 yo with pmhx hypothyroidism, hx of gastritis on PPi, came in for fever, nonproductive cough, chills, dyspnea on exertion, vomiting and diarrhea of 6 day duration(last tuesday). patient was seen in the ed on thursday /prescribed abx and was sent home on oral abx. Patient denied any sick contact, he used e-cig for years with THC(Liquid form) and nicotine. patient denies any chest pain(only pressure), palpitations, PND, orthopnea.  do report 15 lbs weight loss 2ry to vomiting. patient is sexually active with his girlfriend(-ve for HIV). sp chest ct called to evaluate, still reports SOB on minimal exertion      PAST MEDICAL & SURGICAL HISTORY:  Hypothroidism on 100 mcg levothyroxine      SOCIAL HX:   Smoking     E-cig/THC liquid                     FAMILY HISTORY:  .  No cardiovascular or pulmonary family history     Review of System:  See HPI    Allergies    No Known Allergies    Intolerances          PHYSICAL EXAM  Vital Signs Last 24 Hrs  T(C): 36.9 (23 Sep 2019 07:55), Max: 38 (22 Sep 2019 20:31)  T(F): 98.4 (23 Sep 2019 07:55), Max: 100.4 (22 Sep 2019 20:31)  HR: 103 (23 Sep 2019 07:55) (94 - 112)  BP: 133/72 (23 Sep 2019 07:55) (133/72 - 138/73)  RR: 18 (23 Sep 2019 07:55) (18 - 20)  SpO2: 99% (23 Sep 2019 07:55) (97% - 99%)    General: in no respiratory disrtress, sweating, dehydrated  HEENT: JUAN JOSE             Lymphatic system: No cervical LN     Lungs: Ezio BS, bibasilar crackles  Cardiovascular: Regular  Gastrointestinal: Soft.  + BS   Musculoskeletal: No Clubbing.  Full range of motion.. Moves all extremities  Skin: Warm.  Intact  Neurological: No motor or sensory deficit       LABS:                          15.4   11.62 )-----------( 271      ( 22 Sep 2019 21:03 )             42.8                                               09-    134<L>  |  94<L>  |  14  ----------------------------<  97  4.7   |  20  |  1.1    Ca    9.9      22 Sep 2019 21:03    TPro  7.5  /  Alb  4.0  /  TBili  1.3<H>  /  DBili  x   /  AST  44<H>  /  ALT  33  /  AlkPhos  79                                               Urinalysis Basic - ( 22 Sep 2019 21:03 )    Color: Kristina / Appearance: Clear / S.040 / pH: x  Gluc: x / Ketone: Large  / Bili: Moderate / Urobili: 12 mg/dL   Blood: x / Protein: 300 mg/dL / Nitrite: Negative   Leuk Esterase: Negative / RBC: 5 /HPF / WBC 6 /HPF   Sq Epi: x / Non Sq Epi: 4 /HPF / Bacteria: Negative        CARDIAC MARKERS ( 23 Sep 2019 00:15 )  x     / <0.01 ng/mL / x     / x     / x      CARDIAC MARKERS ( 22 Sep 2019 21:03 )  x     / x     / 82 U/L / x     / x                                                LIVER FUNCTIONS - ( 22 Sep 2019 21:03 )  Alb: 4.0 g/dL / Pro: 7.5 g/dL / ALK PHOS: 79 U/L / ALT: 33 U/L / AST: 44 U/L / GGT: x                                                                                                MEDICATIONS  (STANDING):    MEDICATIONS  (PRN):    Imaging:  < from: CT Abdomen and Pelvis w/ IV Cont (19 @ 00:20) >  CHEST:     PULMONARY EMBOLUS: No central or segmental pulmonary emboli.    LUNGS/PLEURA/AIRWAYS: Diffuse groundglass opacities throughout both lung   fields, upper lobe predominant. No pneumothorax or pleural effusions.   Patent central airways.    MEDIASTINUM/THORACIC NODES: Unremarkable.    HEART/GREAT VESSELS: Heart size is within normal limits. No pericardial   effusion. The thoracic aorta and main pulmonary artery within normal   limits.      ABDOMEN/PELVIS:    HEPATOBILIARY: Unremarkable. The gallbladder is suboptimally evaluated.   No intrahepatic or extra hepatic biliary ductal dilation.    SPLEEN: Unremarkable.    PANCREAS: Unremarkable.    ADRENAL GLANDS: Unremarkable.    KIDNEYS: Symmetric renal enhancement. No hydronephrosis.    ABDOMINOPELVIC NODES: Unremarkable.    PELVIC ORGANS: Unremarkable.    PERITONEUM/MESENTERY/BOWEL: Sigmoid colon diverticulosis without evidence   of diverticulitis No bowel obstruction. No pneumoperitoneum or ascites.   Unremarkable appendix.    BONES/SOFT TISSUES: Unremarkable.    OTHER: Normal caliber aorta.      IMPRESSION:       Diffuse ground glass opacities throughout both lung fields, upper lobe   predominant. Findings may represent pneumonia or vaping related lung   injury.    < end of copied text >

## 2019-09-23 NOTE — ED CDU PROVIDER INITIAL DAY NOTE - NS ED ROS FT
Constitutional: + fever, chills  Eyes: No visual changes.  ENT: No hearing changes.  Neck: No neck pain or stiffness.  Cardiovascular: No chest pain, palpitations, edema.  Pulmonary: see hpi  Abdominal:  No nausea, vomiting, diarrhea.  : No dysuria, frequency.  Neuro: No headache, syncope, dizziness.  MS: No back pain. No calf pain/swelling.  Psych: No suicidal ideations.

## 2019-09-24 ENCOUNTER — TRANSCRIPTION ENCOUNTER (OUTPATIENT)
Age: 19
End: 2019-09-24

## 2019-09-24 VITALS
RESPIRATION RATE: 18 BRPM | TEMPERATURE: 97 F | HEART RATE: 67 BPM | SYSTOLIC BLOOD PRESSURE: 130 MMHG | DIASTOLIC BLOOD PRESSURE: 78 MMHG

## 2019-09-24 LAB
HIV 1+2 AB+HIV1 P24 AG SERPL QL IA: SIGNIFICANT CHANGE UP
LEGIONELLA AG UR QL: NEGATIVE — SIGNIFICANT CHANGE UP
PROCALCITONIN SERPL-MCNC: 0.12 NG/ML — HIGH (ref 0.02–0.1)
RAPID RVP RESULT: SIGNIFICANT CHANGE UP

## 2019-09-24 PROCEDURE — 71046 X-RAY EXAM CHEST 2 VIEWS: CPT | Mod: 26

## 2019-09-24 RX ORDER — LEVOFLOXACIN 5 MG/ML
1 INJECTION, SOLUTION INTRAVENOUS
Qty: 7 | Refills: 0
Start: 2019-09-24 | End: 2019-09-30

## 2019-09-24 RX ORDER — SODIUM CHLORIDE 9 MG/ML
1000 INJECTION INTRAMUSCULAR; INTRAVENOUS; SUBCUTANEOUS
Refills: 0 | Status: DISCONTINUED | OUTPATIENT
Start: 2019-09-24 | End: 2019-09-24

## 2019-09-24 RX ORDER — ONDANSETRON 8 MG/1
4 TABLET, FILM COATED ORAL EVERY 8 HOURS
Refills: 0 | Status: DISCONTINUED | OUTPATIENT
Start: 2019-09-24 | End: 2019-09-24

## 2019-09-24 RX ORDER — CHLORHEXIDINE GLUCONATE 213 G/1000ML
1 SOLUTION TOPICAL
Refills: 0 | Status: DISCONTINUED | OUTPATIENT
Start: 2019-09-24 | End: 2019-09-24

## 2019-09-24 RX ORDER — PANTOPRAZOLE SODIUM 20 MG/1
1 TABLET, DELAYED RELEASE ORAL
Qty: 30 | Refills: 0
Start: 2019-09-24 | End: 2019-10-23

## 2019-09-24 RX ORDER — PANTOPRAZOLE SODIUM 20 MG/1
40 TABLET, DELAYED RELEASE ORAL
Refills: 0 | Status: DISCONTINUED | OUTPATIENT
Start: 2019-09-24 | End: 2019-09-24

## 2019-09-24 RX ORDER — SUCRALFATE 1 G
1 TABLET ORAL
Qty: 30 | Refills: 0
Start: 2019-09-24 | End: 2019-10-23

## 2019-09-24 RX ORDER — LEVOTHYROXINE SODIUM 125 MCG
100 TABLET ORAL DAILY
Refills: 0 | Status: DISCONTINUED | OUTPATIENT
Start: 2019-09-24 | End: 2019-09-24

## 2019-09-24 RX ADMIN — SODIUM CHLORIDE 150 MILLILITER(S): 9 INJECTION INTRAMUSCULAR; INTRAVENOUS; SUBCUTANEOUS at 06:28

## 2019-09-24 RX ADMIN — ONDANSETRON 4 MILLIGRAM(S): 8 TABLET, FILM COATED ORAL at 06:55

## 2019-09-24 RX ADMIN — SODIUM CHLORIDE 150 MILLILITER(S): 9 INJECTION INTRAMUSCULAR; INTRAVENOUS; SUBCUTANEOUS at 01:14

## 2019-09-24 RX ADMIN — Medication 60 MILLIGRAM(S): at 00:04

## 2019-09-24 RX ADMIN — Medication 100 MICROGRAM(S): at 06:27

## 2019-09-24 RX ADMIN — Medication 60 MILLIGRAM(S): at 06:27

## 2019-09-24 RX ADMIN — PANTOPRAZOLE SODIUM 40 MILLIGRAM(S): 20 TABLET, DELAYED RELEASE ORAL at 06:27

## 2019-09-24 NOTE — H&P ADULT - NSHPPHYSICALEXAM_GEN_ALL_CORE
PHYSICAL EXAM:T(C): 36.9 (09-23-19 @ 21:30), Max: 37.2 (09-23-19 @ 10:58)  HR: 83 (09-23-19 @ 21:30) (76 - 103)  BP: 127/65 (09-23-19 @ 21:30) (127/65 - 149/78)  RR: 18 (09-23-19 @ 21:30) (18 - 20)  SpO2: 96% (09-23-19 @ 21:20) (95% - 99%)  GENERAL: Anxious  HEAD:  Atraumatic, Normocephalic  EYES: EOMI, PERRLA, conjunctiva and sclera clear  NECK: Supple, No JVD  CHEST/LUNG: Clear to auscultation bilaterally; No wheeze  HEART: Regular rate and rhythm; No murmurs, rubs, or gallops  ABDOMEN: Soft, Nontender, Nondistended; Bowel sounds present  EXTREMITIES:  2+ Peripheral Pulses, No clubbing, cyanosis, or edema  PSYCH: AAOx3  NEUROLOGY: non-focal  SKIN: No rashes or lesions

## 2019-09-24 NOTE — H&P ADULT - HISTORY OF PRESENT ILLNESS
19 year old male with hypothyroidism, gastritis, presented due to 1 week duration of left sided chest pain. Patient reports left sided pain in lower chest, upper abdomen, exacerbated by food and touch, associated with nausea, vomiting, non-bloody nonbilious As well as several episodes of diarrhea, watery non-bloody. He reports feeling overall fatigued, has been having very bad po intake.  Patient with similar presentation in October 2018, was cleared to follow-up with gastroenterology, endoscopy was done, showed gastritis, was started on dicyclomine and protonix, that he stopped taking because they were not helping.  He first presented to ED 5 days ago, discharged as a viral upper respiratory tract infection.  patient denies alcohol or cigarette use, but has been using Vape with nicotine and THC products for years

## 2019-09-24 NOTE — PROGRESS NOTE ADULT - ASSESSMENT
Impression:    highly Vaping-induced lung injury  doubt bacterial pneumonia      Plan:    f/up urine strept/urine legionella  procalcitonin  oxygen prn  IV hydration can dc if tolerates feeds  levaquin 500 q 24h will dc if procalcitonin nl  solumedrol 60 mg q 12 on dc prednisone 40 for 5 days 20 for 5 days  check pulse ox on exertion  CXR

## 2019-09-24 NOTE — DISCHARGE NOTE PROVIDER - CARE PROVIDER_API CALL
Elmo Abarca)  Internal Medicine  [address] [place] [address]  Phone: [phone]  Fax: [phone]  Follow Up Time:

## 2019-09-24 NOTE — H&P ADULT - NSHPLABSRESULTS_GEN_ALL_CORE
15.4   11.62 )-----------( 271      ( 22 Sep 2019 21:03 )             42.8           09-22    134<L>  |  94<L>  |  14  ----------------------------<  97  4.7   |  20  |  1.1    Ca    9.9      22 Sep 2019 21:03    TPro  7.5  /  Alb  4.0  /  TBili  1.3<H>  /  DBili  x   /  AST  44<H>  /  ALT  33  /  AlkPhos  79  09-22        < from: 12 Lead ECG (09.22.19 @ 23:32) >      Diagnosis Line Normal sinus rhythm  Nonspecific T wave abnormality  Abnormal ECG    < end of copied text >          < from: CT Abdomen and Pelvis w/ IV Cont (09.23.19 @ 00:20) >      IMPRESSION:       Diffuse ground glass opacities throughout both lung fields, upper lobe   predominant. Findings may represent pneumonia or vaping related lung   injury.    Dr. Sourav Anna discussed preliminary findings with ENRIQUE NICOLAS on   9/23/2019 12:33 AM with readback.      < end of copied text >

## 2019-09-24 NOTE — DISCHARGE NOTE PROVIDER - NSDCCPCAREPLAN_GEN_ALL_CORE_FT
PRINCIPAL DISCHARGE DIAGNOSIS  Diagnosis: Acute lung injury  Assessment and Plan of Treatment: Your pain is highly likely secondary to your vaping use. You are being discharged on steroids which you must take as prescribed. Please follow up with your primary care provider in 2-3 weeks.      SECONDARY DISCHARGE DIAGNOSES  Diagnosis: Abdominal pain with vomiting  Assessment and Plan of Treatment: Your vomiting and abdominal pain are also highly likely to be due to vaping. Please follow up with your [facility] care doctor. PRINCIPAL DISCHARGE DIAGNOSIS  Diagnosis: Acute lung injury  Assessment and Plan of Treatment: Your pain is highly likely secondary to your vaping use. You are being discharged on steroids and antibiotics which you must take as prescribed. Please follow up with your primary care provider in 2-3 days.      SECONDARY DISCHARGE DIAGNOSES  Diagnosis: Abdominal pain with vomiting  Assessment and Plan of Treatment: Your vomiting and abdominal pain are also highly likely to be due to vaping. Please follow up with your [facility] care doctor.

## 2019-09-24 NOTE — DISCHARGE NOTE NURSING/CASE MANAGEMENT/SOCIAL WORK - PATIENT PORTAL LINK FT
You can access the FollowMyHealth Patient Portal offered by [facility] by registering at the following website: [email or web address]. By joining [facility]’s FollowMyHealth portal, you will also be able to view your health information using other applications (apps) compatible with our system.

## 2019-09-24 NOTE — H&P ADULT - NSHPSOCIALHISTORY_GEN_ALL_CORE
patient denies alcohol or cigarette use, but has been using Vape with nicotine and THC products for years

## 2019-09-24 NOTE — H&P ADULT - ATTENDING COMMENTS
19 year old male with hypothyroidism, gastritis, presented due to 1 week duration of left sided chest pain, nausea, vomiting, found to have diffuse ground glass opacities on Chest CT.- admits to vaping THC products    Pt seen and examined- clinically improving    Chart reviewed- agree with above and pulm note    empiric abx    f/u all studies    O2 as needed    OOB    Plan as per pulmonary

## 2019-09-24 NOTE — CHART NOTE - NSCHARTNOTEFT_GEN_A_CORE
To Whom It May Concern:    Jeremy Kristyn was under my care from 9/22/2019 to 9/24/2019. Please excuse him from class for those days.

## 2019-09-24 NOTE — PROGRESS NOTE ADULT - SUBJECTIVE AND OBJECTIVE BOX
OVERNIGHT EVENTS: events noted, tolerating feeds, feels better, occ cough, RVP neg    Vital Signs Last 24 Hrs  T(C): 35.8 (24 Sep 2019 06:27), Max: 37.2 (23 Sep 2019 10:58)  T(F): 96.5 (24 Sep 2019 06:27), Max: 99 (23 Sep 2019 10:58)  HR: 89 (24 Sep 2019 06:27) (76 - 89)  BP: 116/70 (24 Sep 2019 06:27) (116/70 - 149/78)  RR: 16 (24 Sep 2019 06:27) (16 - 20)  SpO2: 96% (23 Sep 2019 21:20) (95% - 99%)    PHYSICAL EXAMINATION:    GENERAL: The patient is awake and alert in no apparent distress.     HEENT: Head is normocephalic and atraumatic. Extraocular muscles are intact. Mucous membranes are moist.    NECK: Supple.    LUNGS: mild bibasilar crackles    HEART: Regular rate and rhythm without murmur.    ABDOMEN: Soft, nontender, and nondistended.      EXTREMITIES: Without any cyanosis, clubbing, rash, lesions or edema.    NEUROLOGIC: Grossly intact.    SKIN: No ulceration or induration present.      LABS:                        15.4   11.62 )-----------( 271      ( 22 Sep 2019 21:03 )             42.8     09-    134<L>  |  94<L>  |  14  ----------------------------<  97  4.7   |  20  |  1.1    Ca    9.9      22 Sep 2019 21:03    TPro  7.5  /  Alb  4.0  /  TBili  1.3<H>  /  DBili  x   /  AST  44<H>  /  ALT  33  /  AlkPhos  79  09-22      Urinalysis Basic - ( 22 Sep 2019 21:03 )    Color: Kristina / Appearance: Clear / S.040 / pH: x  Gluc: x / Ketone: Large  / Bili: Moderate / Urobili: 12 mg/dL   Blood: x / Protein: 300 mg/dL / Nitrite: Negative   Leuk Esterase: Negative / RBC: 5 /HPF / WBC 6 /HPF   Sq Epi: x / Non Sq Epi: 4 /HPF / Bacteria: Negative        CARDIAC MARKERS ( 23 Sep 2019 00:15 )  x     / <0.01 ng/mL / x     / x     / x      CARDIAC MARKERS ( 22 Sep 2019 21:03 )  x     / x     / 82 U/L / x     / x                        MICROBIOLOGY:  Culture Results:   No growth to date. ( @ 21:03)  Culture Results:   No growth to date. ( @ 21:03)      MEDICATIONS  (STANDING):  chlorhexidine 4% Liquid 1 Application(s) Topical <User Schedule>  influenza   Vaccine 0.5 milliLiter(s) IntraMuscular once  levoFLOXacin  Tablet 500 milliGRAM(s) Oral every 24 hours  levothyroxine 100 MICROGram(s) Oral daily  methylPREDNISolone sodium succinate Injectable 60 milliGRAM(s) IV Push every 12 hours  pantoprazole    Tablet 40 milliGRAM(s) Oral before breakfast  sodium chloride 0.9%. 1000 milliLiter(s) (150 mL/Hr) IV Continuous <Continuous>    MEDICATIONS  (PRN):  ondansetron Injectable 4 milliGRAM(s) IV Push every 8 hours PRN Nausea and/or Vomiting      RADIOLOGY & ADDITIONAL STUDIES:

## 2019-09-24 NOTE — H&P ADULT - ASSESSMENT
19 year old male with hypothyroidism, gastritis, presented due to 1 week duration of left sided chest pain, nausea, vomiting, found to have diffuse ground glass opacities on Chest CT.    #Diffuse bilateral ground glass opacities, pneumonia vs. vaping associated lung injury  Pulmonary following   Procalcitonin, RVP, Strep/Legionella urine antigens sent  Continue Levaquin po 500mg q12    IV Solumedrol 60mg q12  Repeat CXR in AM  Ambulating pulse Ox    #Nausea, abdominal pain, vomiting, diarrhea  Protonix 40mg qd  IV fluids NSS at 150cc/hr  Clear liquid diet, asdvance as tolerated  Possibly an element of anxiety contributing to symptoms    #DVT PPX: Patient ambulating  #GI PPX: Protonix  #Dispo: From home  #Activity: As tolerated 19 year old male with hypothyroidism, gastritis, presented due to 1 week duration of left sided chest pain, nausea, vomiting, found to have diffuse ground glass opacities on Chest CT.    #Diffuse bilateral ground glass opacities, pneumonia vs. vaping associated lung injury  Pulmonary following   Procalcitonin, RVP, Strep/Legionella urine antigens sent  Continue Levaquin po 500mg q12    IV Solumedrol 60mg q12  Repeat CXR in AM  Ambulating pulse Ox    #Nausea, abdominal pain, vomiting, diarrhea  CT A/P suggestive of diverticulosis, no other acute pathology  Protonix 40mg qd  IV fluids NSS at 150cc/hr  Clear liquid diet, advance as tolerated  Possibly an element of anxiety contributing to symptoms    #DVT PPX: Patient ambulating  #GI PPX: Protonix  #Dispo: From home  #Activity: As tolerated

## 2019-09-25 LAB — FUNGITELL: <31 PG/ML — SIGNIFICANT CHANGE UP

## 2019-09-25 RX ORDER — PANTOPRAZOLE SODIUM 20 MG/1
1 TABLET, DELAYED RELEASE ORAL
Qty: 30 | Refills: 0
Start: 2019-09-25 | End: 2019-10-24

## 2019-09-26 DIAGNOSIS — R07.89 OTHER CHEST PAIN: ICD-10-CM

## 2019-09-26 DIAGNOSIS — E03.9 HYPOTHYROIDISM, UNSPECIFIED: ICD-10-CM

## 2019-09-26 DIAGNOSIS — F17.200 NICOTINE DEPENDENCE, UNSPECIFIED, UNCOMPLICATED: ICD-10-CM

## 2019-09-26 DIAGNOSIS — R11.2 NAUSEA WITH VOMITING, UNSPECIFIED: ICD-10-CM

## 2019-09-26 DIAGNOSIS — R10.9 UNSPECIFIED ABDOMINAL PAIN: ICD-10-CM

## 2019-09-26 DIAGNOSIS — R19.7 DIARRHEA, UNSPECIFIED: ICD-10-CM

## 2019-09-28 LAB
CULTURE RESULTS: SIGNIFICANT CHANGE UP
CULTURE RESULTS: SIGNIFICANT CHANGE UP
SPECIMEN SOURCE: SIGNIFICANT CHANGE UP
SPECIMEN SOURCE: SIGNIFICANT CHANGE UP

## 2019-09-30 LAB — S PNEUM AG UR QL: NEGATIVE — SIGNIFICANT CHANGE UP

## 2019-11-02 ENCOUNTER — OUTPATIENT (OUTPATIENT)
Dept: OUTPATIENT SERVICES | Facility: HOSPITAL | Age: 19
LOS: 1 days | Discharge: HOME | End: 2019-11-02
Payer: COMMERCIAL

## 2019-11-02 DIAGNOSIS — J95.84 TRANSFUSION-RELATED ACUTE LUNG INJURY (TRALI): ICD-10-CM

## 2019-11-02 PROBLEM — K29.70 GASTRITIS, UNSPECIFIED, WITHOUT BLEEDING: Chronic | Status: ACTIVE | Noted: 2019-09-24

## 2019-11-02 PROBLEM — E03.9 HYPOTHYROIDISM, UNSPECIFIED: Chronic | Status: ACTIVE | Noted: 2019-09-24

## 2019-11-02 PROCEDURE — 71046 X-RAY EXAM CHEST 2 VIEWS: CPT | Mod: 26

## 2020-07-14 ENCOUNTER — APPOINTMENT (OUTPATIENT)
Dept: ENDOCRINOLOGY | Facility: CLINIC | Age: 20
End: 2020-07-14

## 2020-07-24 NOTE — MEDICAL STUDENT PROGRESS NOTE(EDUCATION) - SUBJECTIVE AND OBJECTIVE BOX
Patient is a 19y old  Male who presents with a chief complaint of exertional shortness of breath, nonbloody watery diarrhea, and N/V a/w subjective 15lb weight loss/1wk. HD#1.(24 Sep 2019 00:55)  Admitted for diffuse bilateral ground glass opacities - pneumonia vs. vaping associated lung injury, and diverticulosis.    OVERNIGHT EVENTS: No overnight events.     Patient awoke with chills and diaphoresis, now resolved. Patient is now resting comfortably in bed. Tolerating liquid diet. Continues to complain of nausea despite zofran, but denies any episodes of vomiting since admission. Continues to complain of exertional shortness of breath with associated mid-chest pain/pressure, but denies any exacerbation of pain with food/position/palpation. No exacerbating factors other than exertion. Chest pain/pressure improves with deep breathing/short rapid breathing.     SUBJECTIVE / INTERVAL HPI: Patient seen and examined at bedside.     VITAL SIGNS:  Vital Signs Last 24 Hrs  T(C): 35.8 (24 Sep 2019 06:27), Max: 37.2 (23 Sep 2019 10:58)  T(F): 96.5 (24 Sep 2019 06:27), Max: 99 (23 Sep 2019 10:58)  HR: 89 (24 Sep 2019 06:27) (76 - 103)  BP: 116/70 (24 Sep 2019 06:27) (116/70 - 149/78)  BP(mean): --  RR: 16 (24 Sep 2019 06:27) (16 - 20)  SpO2: 96% (23 Sep 2019 21:20) (95% - 99%)    PHYSICAL EXAM:    General: WDWN  HEENT: NC/AT; PERRL, clear conjunctiva  Neck: supple  Cardiovascular: +S1/S2; RRR  Respiratory: CTA b/l; no W/R/R  Gastrointestinal: soft, NT/ND; +BSx4  Extremities: WWP; 2+ peripheral pulses; no edema   Neurological: AAOx3; no focal deficits    MEDICATIONS:  MEDICATIONS  (STANDING):  chlorhexidine 4% Liquid 1 Application(s) Topical <User Schedule>  influenza   Vaccine 0.5 milliLiter(s) IntraMuscular once  levoFLOXacin  Tablet 500 milliGRAM(s) Oral every 24 hours  levothyroxine 100 MICROGram(s) Oral daily  methylPREDNISolone sodium succinate Injectable 60 milliGRAM(s) IV Push every 12 hours  pantoprazole    Tablet 40 milliGRAM(s) Oral before breakfast  sodium chloride 0.9%. 1000 milliLiter(s) (150 mL/Hr) IV Continuous <Continuous>    MEDICATIONS  (PRN):  ondansetron Injectable 4 milliGRAM(s) IV Push every 8 hours PRN Nausea and/or Vomiting      ALLERGIES:  Allergies    No Known Allergies    Intolerances        LABS:                        15.4   11.62 )-----------( 271      ( 22 Sep 2019 21:03 )             42.8         134<L>  |  94<L>  |  14  ----------------------------<  97  4.7   |  20  |  1.1    Ca    9.9      22 Sep 2019 21:03    TPro  7.5  /  Alb  4.0  /  TBili  1.3<H>  /  DBili  x   /  AST  44<H>  /  ALT  33  /  AlkPhos  79        Urinalysis Basic - ( 22 Sep 2019 21:03 )    Color: Kristina / Appearance: Clear / S.040 / pH: x  Gluc: x / Ketone: Large  / Bili: Moderate / Urobili: 12 mg/dL   Blood: x / Protein: 300 mg/dL / Nitrite: Negative   Leuk Esterase: Negative / RBC: 5 /HPF / WBC 6 /HPF   Sq Epi: x / Non Sq Epi: 4 /HPF / Bacteria: Negative      CAPILLARY BLOOD GLUCOSE          RADIOLOGY & ADDITIONAL TESTS: Reviewed.    ASSESSMENT: 18yo M with h/o hypothyroidism and gastritis, presented 2/2 fever, chills, nonbloody diarrhea, and N/V a/w 15lb weight loss x1wk. Also complains of exertional dyspnea a/w chest/abdominal pain localized to epigastric area, that improves with deep breathing/short rapid breathing. Found to have diffuse ground glass opacities on Chest CT.    PLAN:   #Diffuse bilateral ground glass opacities, pneumonia vs. vaping associated lung injury  Pulmonary following  RVP - negative  HIV - negative  Procalcitonin, Strep/Legionella urine antigens sent, pending results  Continue Levaquin po 500mg q12    Continue IV Solumedrol 60mg q12  Repeat CXR in AM - ordered, pending  Ambulating pulse Ox    #Nausea, abdominal pain, vomiting, diarrhea  CT A/P suggestive of diverticulosis, no other acute pathology  Continue Protonix 40mg qd  Continue IV fluids NSS at 150cc/hr  Clear liquid diet, advance as tolerated  Possibly an element of anxiety contributing to symptoms Patient is a 19y old  Male who presents with a chief complaint of exertional shortness of breath, nonbloody watery diarrhea, and N/V a/w subjective 15lb weight loss/1wk. HD#1.(24 Sep 2019 00:55)  Admitted for diffuse bilateral ground glass opacities - pneumonia vs. vaping associated lung injury, and diverticulosis.    OVERNIGHT EVENTS: No overnight events.     Patient awoke with chills and diaphoresis, now resolved. Patient is now resting comfortably in bed. Tolerating liquid diet. Continues to complain of nausea despite zofran, but denies any episodes of vomiting since admission. Continues to complain of exertional shortness of breath with associated mid-chest pain/pressure, but denies any exacerbation of pain with food/position/palpation. No exacerbating factors other than exertion. Chest pain/pressure improves with deep breathing/short rapid breathing.     SUBJECTIVE / INTERVAL HPI: Patient seen and examined at bedside.     VITAL SIGNS:  Vital Signs Last 24 Hrs  T(C): 35.8 (24 Sep 2019 06:27), Max: 37.2 (23 Sep 2019 10:58)  T(F): 96.5 (24 Sep 2019 06:27), Max: 99 (23 Sep 2019 10:58)  HR: 89 (24 Sep 2019 06:27) (76 - 103)  BP: 116/70 (24 Sep 2019 06:27) (116/70 - 149/78)  BP(mean): --  RR: 16 (24 Sep 2019 06:27) (16 - 20)  SpO2: 96% (23 Sep 2019 21:20) (95% - 99%)    PHYSICAL EXAM:    General: WDWN  HEENT: NC/AT; PERRL, clear conjunctiva  Neck: supple  Cardiovascular: +S1/S2; RRR  Respiratory: CTA b/l; no W/R/R  Gastrointestinal: soft, NT/ND; +BSx4  Extremities: WWP; 2+ peripheral pulses; no edema   Neurological: AAOx3; no focal deficits    MEDICATIONS:  MEDICATIONS  (STANDING):  chlorhexidine 4% Liquid 1 Application(s) Topical <User Schedule>  influenza   Vaccine 0.5 milliLiter(s) IntraMuscular once  levoFLOXacin  Tablet 500 milliGRAM(s) Oral every 24 hours  levothyroxine 100 MICROGram(s) Oral daily  methylPREDNISolone sodium succinate Injectable 60 milliGRAM(s) IV Push every 12 hours  pantoprazole    Tablet 40 milliGRAM(s) Oral before breakfast  sodium chloride 0.9%. 1000 milliLiter(s) (150 mL/Hr) IV Continuous <Continuous>    MEDICATIONS  (PRN):  ondansetron Injectable 4 milliGRAM(s) IV Push every 8 hours PRN Nausea and/or Vomiting      ALLERGIES:  Allergies    No Known Allergies    Intolerances        LABS:                        15.4   11.62 )-----------( 271      ( 22 Sep 2019 21:03 )             42.8         134<L>  |  94<L>  |  14  ----------------------------<  97  4.7   |  20  |  1.1    Ca    9.9      22 Sep 2019 21:03    TPro  7.5  /  Alb  4.0  /  TBili  1.3<H>  /  DBili  x   /  AST  44<H>  /  ALT  33  /  AlkPhos  79        Urinalysis Basic - ( 22 Sep 2019 21:03 )    Color: Kristina / Appearance: Clear / S.040 / pH: x  Gluc: x / Ketone: Large  / Bili: Moderate / Urobili: 12 mg/dL   Blood: x / Protein: 300 mg/dL / Nitrite: Negative   Leuk Esterase: Negative / RBC: 5 /HPF / WBC 6 /HPF   Sq Epi: x / Non Sq Epi: 4 /HPF / Bacteria: Negative      CAPILLARY BLOOD GLUCOSE          RADIOLOGY & ADDITIONAL TESTS: Reviewed.    ASSESSMENT: 20yo M with h/o hypothyroidism and gastritis, presented 2/2 fever, chills, nonbloody diarrhea, and N/V a/w 15lb weight loss x1wk. Also complains of exertional dyspnea a/w chest/abdominal pain localized to epigastric area, that improves with deep breathing/short rapid breathing. Found to have diffuse ground glass opacities on Chest CT.    PLAN:   #Diffuse bilateral ground glass opacities, pneumonia vs. vaping associated lung injury  Pulmonary following  RVP - negative  HIV - negative  Procalcitonin, Strep/Legionella urine antigens sent, pending results  Continue Levaquin po 500mg q12    Continue IV Solumedrol 60mg q12  Chest XRAY preliminary read negative  Discharge today      #Nausea, abdominal pain, vomiting, diarrhea  CT A/P suggestive of diverticulosis, no other acute pathology  Continue Protonix 40mg qd  Tolerating regular diet  Possibly an element of anxiety contributing to symptoms Patient is a 19y old  Male who presents with a chief complaint of exertional shortness of breath, nonbloody watery diarrhea, and N/V a/w subjective 15lb weight loss/1wk. HD#1.(24 Sep 2019 00:55)  Admitted for diffuse bilateral ground glass opacities - pneumonia vs. vaping associated lung injury, and diverticulosis.    OVERNIGHT EVENTS: No overnight events.     Patient awoke with chills and diaphoresis, now resolved. Patient is now resting comfortably in bed. Tolerating liquid diet. Continues to complain of nausea despite zofran, but denies any episodes of vomiting since admission. Continues to complain of exertional shortness of breath with associated mid-chest pain/pressure, but denies any exacerbation of pain with food/position/palpation. No exacerbating factors other than exertion. Chest pain/pressure improves with deep breathing/short rapid breathing.     SUBJECTIVE / INTERVAL HPI: Patient seen and examined at bedside.     VITAL SIGNS:  Vital Signs Last 24 Hrs  T(C): 35.8 (24 Sep 2019 06:27), Max: 37.2 (23 Sep 2019 10:58)  T(F): 96.5 (24 Sep 2019 06:27), Max: 99 (23 Sep 2019 10:58)  HR: 89 (24 Sep 2019 06:27) (76 - 103)  BP: 116/70 (24 Sep 2019 06:27) (116/70 - 149/78)  BP(mean): --  RR: 16 (24 Sep 2019 06:27) (16 - 20)  SpO2: 96% (23 Sep 2019 21:20) (95% - 99%)    PHYSICAL EXAM:    General: WDWN  HEENT: NC/AT; PERRL, clear conjunctiva  Neck: supple  Cardiovascular: +S1/S2; RRR  Respiratory: CTA b/l; no W/R/R  Gastrointestinal: soft, NT/ND; +BSx4  Extremities: WWP; 2+ peripheral pulses; no edema   Neurological: AAOx3; no focal deficits    MEDICATIONS:  MEDICATIONS  (STANDING):  chlorhexidine 4% Liquid 1 Application(s) Topical <User Schedule>  influenza   Vaccine 0.5 milliLiter(s) IntraMuscular once  levoFLOXacin  Tablet 500 milliGRAM(s) Oral every 24 hours  levothyroxine 100 MICROGram(s) Oral daily  methylPREDNISolone sodium succinate Injectable 60 milliGRAM(s) IV Push every 12 hours  pantoprazole    Tablet 40 milliGRAM(s) Oral before breakfast  sodium chloride 0.9%. 1000 milliLiter(s) (150 mL/Hr) IV Continuous <Continuous>    MEDICATIONS  (PRN):  ondansetron Injectable 4 milliGRAM(s) IV Push every 8 hours PRN Nausea and/or Vomiting      ALLERGIES:  Allergies    No Known Allergies    Intolerances        LABS:                        15.4   11.62 )-----------( 271      ( 22 Sep 2019 21:03 )             42.8         134<L>  |  94<L>  |  14  ----------------------------<  97  4.7   |  20  |  1.1    Ca    9.9      22 Sep 2019 21:03    TPro  7.5  /  Alb  4.0  /  TBili  1.3<H>  /  DBili  x   /  AST  44<H>  /  ALT  33  /  AlkPhos  79        Urinalysis Basic - ( 22 Sep 2019 21:03 )    Color: Kristina / Appearance: Clear / S.040 / pH: x  Gluc: x / Ketone: Large  / Bili: Moderate / Urobili: 12 mg/dL   Blood: x / Protein: 300 mg/dL / Nitrite: Negative   Leuk Esterase: Negative / RBC: 5 /HPF / WBC 6 /HPF   Sq Epi: x / Non Sq Epi: 4 /HPF / Bacteria: Negative      CAPILLARY BLOOD GLUCOSE          RADIOLOGY & ADDITIONAL TESTS: Reviewed.    ASSESSMENT: 20yo M with h/o hypothyroidism and gastritis, presented 2/2 fever, chills, nonbloody diarrhea, and N/V a/w 15lb weight loss x1wk. Also complains of exertional dyspnea a/w chest/abdominal pain localized to epigastric area, that improves with deep breathing/short rapid breathing. Found to have diffuse ground glass opacities on Chest CT.    PLAN:   #Diffuse bilateral ground glass opacities, pneumonia vs. vaping associated lung injury  Pulmonary following  RVP - negative  HIV - negative  Procalcitonin, Strep/Legionella urine antigens sent, pending results  Continue Levaquin po 500mg q12    Continue IV Solumedrol 60mg q12  Chest XRAY improved but shows reticular opacities  Discharge today      #Nausea, abdominal pain, vomiting, diarrhea  CT A/P suggestive of diverticulosis, no other acute pathology  Continue Protonix 40mg qd  Tolerating regular diet  Possibly an element of anxiety contributing to symptoms Verbalized Understanding/Simple: Patient demonstrates quick and easy understanding

## 2021-04-10 ENCOUNTER — OUTPATIENT (OUTPATIENT)
Dept: OUTPATIENT SERVICES | Facility: HOSPITAL | Age: 21
LOS: 1 days | Discharge: HOME | End: 2021-04-10
Payer: COMMERCIAL

## 2021-04-10 DIAGNOSIS — E07.9 DISORDER OF THYROID, UNSPECIFIED: ICD-10-CM

## 2021-04-10 PROCEDURE — 76536 US EXAM OF HEAD AND NECK: CPT | Mod: 26

## 2022-02-02 NOTE — ED CDU PROVIDER INITIAL DAY NOTE - PROGRESS NOTE3
Stable. Complex Repair And Modified Advancement Flap Text: The defect edges were debeveled with a #15 scalpel blade.  The primary defect was closed partially with a complex linear closure.  Given the location of the remaining defect, shape of the defect and the proximity to free margins a modified advancement flap was deemed most appropriate for complete closure of the defect.  Using a sterile surgical marker, an appropriate advancement flap was drawn incorporating the defect and placing the expected incisions within the relaxed skin tension lines where possible.    The area thus outlined was incised deep to adipose tissue with a #15 scalpel blade.  The skin margins were undermined to an appropriate distance in all directions utilizing iris scissors.

## 2022-10-19 ENCOUNTER — TRANSCRIPTION ENCOUNTER (OUTPATIENT)
Age: 22
End: 2022-10-19

## 2023-07-21 ENCOUNTER — APPOINTMENT (OUTPATIENT)
Dept: SURGERY | Facility: CLINIC | Age: 23
End: 2023-07-21
Payer: COMMERCIAL

## 2023-07-21 VITALS
TEMPERATURE: 96.8 F | DIASTOLIC BLOOD PRESSURE: 84 MMHG | HEART RATE: 92 BPM | BODY MASS INDEX: 29.35 KG/M2 | WEIGHT: 205 LBS | HEIGHT: 70 IN | OXYGEN SATURATION: 98 % | SYSTOLIC BLOOD PRESSURE: 122 MMHG

## 2023-07-21 PROCEDURE — 99202 OFFICE O/P NEW SF 15 MIN: CPT

## 2023-07-21 NOTE — PLAN
[FreeTextEntry1] : Discussed with patient therapy less complications to excise this after it had completely healed from the previous infection.  We will see back in a month to ensure complete healing and we will schedule excision of the cyst at that time.  The patient would like to be under anesthesia in the operating room.

## 2023-07-21 NOTE — PHYSICAL EXAM
[Alert] : alert [Oriented to Person] : oriented to person [Oriented to Place] : oriented to place [Calm] : calm [de-identified] : no distress [de-identified] : nonlabored breathing  [de-identified] : s1,s2 [de-identified] : smooth right chest area of mild cellulitis and healing wound

## 2023-07-21 NOTE — HISTORY OF PRESENT ILLNESS
[de-identified] : 23-year-old male with a history of an infected sebaceous cyst over his right chest.  It was I&D and a lot of pus was expressed by the patient.  He has been on antibiotics.  Since that time it has improved significantly and it has almost completely healed, except for some mild cellulitis.  At this time there is no palpable cyst in this area.

## 2023-08-18 ENCOUNTER — APPOINTMENT (OUTPATIENT)
Dept: SURGERY | Facility: CLINIC | Age: 23
End: 2023-08-18
Payer: COMMERCIAL

## 2023-08-18 VITALS
BODY MASS INDEX: 30.49 KG/M2 | WEIGHT: 213 LBS | DIASTOLIC BLOOD PRESSURE: 70 MMHG | HEIGHT: 70 IN | OXYGEN SATURATION: 98 % | SYSTOLIC BLOOD PRESSURE: 118 MMHG | HEART RATE: 84 BPM | TEMPERATURE: 97.7 F

## 2023-08-18 DIAGNOSIS — L72.0 EPIDERMAL CYST: ICD-10-CM

## 2023-08-18 PROCEDURE — 99212 OFFICE O/P EST SF 10 MIN: CPT

## 2023-08-19 PROBLEM — L72.0 EPIDERMOID CYST OF SKIN OF CHEST: Status: RESOLVED | Noted: 2023-07-21 | Resolved: 2023-08-19

## 2023-08-19 NOTE — HISTORY OF PRESENT ILLNESS
[de-identified] : 23-year-old male with a history of an infected sebaceous cyst over his right chest.  It was I&Ded and a lot of pus was expressed by the patient.  He has been on antibiotics.  Since that time it has improved significantly and it has almost completely healed, except for some mild cellulitis.  At this time there is no palpable cyst in this area.  Since his last visit the area has completely healed- there is no residual palpable mass there now. He has never had it before and doesnt remember any mass there before the abscess.

## 2023-08-19 NOTE — PHYSICAL EXAM
[Alert] : alert [Oriented to Person] : oriented to person [Oriented to Place] : oriented to place [Calm] : calm [de-identified] : no distress [de-identified] : nonlabored breathing  [de-identified] : s1,s2 [de-identified] : axillary scar well healed, right side, no palpable mass

## 2023-08-19 NOTE — ASSESSMENT
[FreeTextEntry1] : 24 yo male with recent axillary abscess vs infected sebaceous cyst, favor abscess in setting of no mass previously or now

## 2023-09-27 NOTE — ED PEDIATRIC TRIAGE NOTE - TEMP(CELSIUS)
SARS-COV-2, POC Not-Detected Not Detected    Lot Number [id number]     QC Pass/Fail Pass     Performing Instrument BinaxNOW        Results for orders placed or performed in visit on 09/27/23   POCT COVID-19, Antigen   Result Value Ref Range    SARS-COV-2, POC Not-Detected Not Detected    Lot Number [id number]     QC Pass/Fail Pass     Performing Instrument BinaxNOW      XR Results (most recent):  @[facility]([id number])@         Review of Systems   Constitutional: Negative. HENT:  Positive for congestion, ear pain, postnasal drip and rhinorrhea. Eyes: Negative. Respiratory: Negative. Cardiovascular: Negative. Gastrointestinal: Negative. Endocrine: Negative. Genitourinary: Negative. Musculoskeletal: Negative. Skin: Negative. Allergic/Immunologic: Negative. Neurological: Negative. Hematological: Negative. Psychiatric/Behavioral: Negative. All other systems reviewed and are negative. Physical Exam  Vitals and nursing note reviewed. Constitutional:       General: She is not in acute distress. Appearance: Normal appearance. HENT:      Head: Normocephalic and atraumatic. Right Ear: Ear canal and external ear normal. A middle ear effusion is present. Tympanic membrane is erythematous. Left Ear: Ear canal and external ear normal. Tenderness present. A middle ear effusion is present. Tympanic membrane is erythematous. Ears:      Comments: EXTERNAL EAR CANAL ERYTHEMATOUS BILATERAL     Nose: Congestion and rhinorrhea present. Mouth/Throat:      Mouth: Mucous membranes are moist.      Pharynx: Oropharynx is clear. Posterior oropharyngeal erythema present. No oropharyngeal exudate. Eyes:      Extraocular Movements: Extraocular movements intact. Conjunctiva/sclera: Conjunctivae normal.      Pupils: Pupils are equal, round, and reactive to light. Cardiovascular:      Rate and Rhythm: Normal rate and regular rhythm.    Pulmonary:      Effort:
36.7

## 2023-10-13 ENCOUNTER — APPOINTMENT (OUTPATIENT)
Dept: SURGERY | Facility: CLINIC | Age: 23
End: 2023-10-13
Payer: COMMERCIAL

## 2023-10-13 VITALS
HEIGHT: 70 IN | BODY MASS INDEX: 30.49 KG/M2 | OXYGEN SATURATION: 99 % | WEIGHT: 213 LBS | SYSTOLIC BLOOD PRESSURE: 112 MMHG | HEART RATE: 89 BPM | TEMPERATURE: 97.1 F | DIASTOLIC BLOOD PRESSURE: 74 MMHG

## 2023-10-13 PROCEDURE — 99212 OFFICE O/P EST SF 10 MIN: CPT

## 2023-10-16 ENCOUNTER — NON-APPOINTMENT (OUTPATIENT)
Age: 23
End: 2023-10-16

## 2023-10-27 NOTE — ASU PATIENT PROFILE, ADULT - NSICDXPASTMEDICALHX_GEN_ALL_CORE_FT
PAST MEDICAL HISTORY:  Gastritis     Hypothyroidism      PAST MEDICAL HISTORY:  Gastritis     History of Hashimoto thyroiditis     Hypothyroidism

## 2023-10-27 NOTE — ASU PATIENT PROFILE, ADULT - FALL HARM RISK - UNIVERSAL INTERVENTIONS
Bed in lowest position, wheels locked, appropriate side rails in place/Call bell, personal items and telephone in reach/Instruct patient to call for assistance before getting out of bed or chair/Non-slip footwear when patient is out of bed/[place] to call system/Physically safe environment - no spills, clutter or unnecessary equipment/Purposeful Proactive Rounding/Room/bathroom lighting operational, light cord in reach

## 2023-10-30 ENCOUNTER — TRANSCRIPTION ENCOUNTER (OUTPATIENT)
Age: 23
End: 2023-10-30

## 2023-10-30 ENCOUNTER — OUTPATIENT (OUTPATIENT)
Dept: OUTPATIENT SERVICES | Facility: HOSPITAL | Age: 23
LOS: 1 days | Discharge: HOME | End: 2023-10-30
Payer: COMMERCIAL

## 2023-10-30 ENCOUNTER — RESULT REVIEW (OUTPATIENT)
Age: 23
End: 2023-10-30

## 2023-10-30 ENCOUNTER — APPOINTMENT (OUTPATIENT)
Dept: SURGERY | Facility: HOSPITAL | Age: 23
End: 2023-10-30

## 2023-10-30 VITALS
WEIGHT: 212.97 LBS | RESPIRATION RATE: 24 BRPM | SYSTOLIC BLOOD PRESSURE: 118 MMHG | HEART RATE: 73 BPM | TEMPERATURE: 98 F | DIASTOLIC BLOOD PRESSURE: 59 MMHG | OXYGEN SATURATION: 98 % | HEIGHT: 70 IN

## 2023-10-30 VITALS
DIASTOLIC BLOOD PRESSURE: 66 MMHG | SYSTOLIC BLOOD PRESSURE: 105 MMHG | HEART RATE: 61 BPM | RESPIRATION RATE: 14 BRPM | OXYGEN SATURATION: 100 %

## 2023-10-30 DIAGNOSIS — L72.0 EPIDERMAL CYST: ICD-10-CM

## 2023-10-30 DIAGNOSIS — L72.3 SEBACEOUS CYST: ICD-10-CM

## 2023-10-30 DIAGNOSIS — L02.411 CUTANEOUS ABSCESS OF RIGHT AXILLA: ICD-10-CM

## 2023-10-30 DIAGNOSIS — E06.3 AUTOIMMUNE THYROIDITIS: ICD-10-CM

## 2023-10-30 DIAGNOSIS — K21.9 GASTRO-ESOPHAGEAL REFLUX DISEASE WITHOUT ESOPHAGITIS: ICD-10-CM

## 2023-10-30 PROCEDURE — 88304 TISSUE EXAM BY PATHOLOGIST: CPT | Mod: 26

## 2023-10-30 PROCEDURE — 11403 EXC TR-EXT B9+MARG 2.1-3CM: CPT

## 2023-10-30 PROCEDURE — 88304 TISSUE EXAM BY PATHOLOGIST: CPT

## 2023-10-30 RX ORDER — SODIUM CHLORIDE 9 MG/ML
1000 INJECTION, SOLUTION INTRAVENOUS
Refills: 0 | Status: DISCONTINUED | OUTPATIENT
Start: 2023-10-30 | End: 2023-10-30

## 2023-10-30 RX ORDER — OXYCODONE HYDROCHLORIDE 5 MG/1
5 TABLET ORAL ONCE
Refills: 0 | Status: DISCONTINUED | OUTPATIENT
Start: 2023-10-30 | End: 2023-10-30

## 2023-10-30 RX ORDER — HYDROMORPHONE HYDROCHLORIDE 2 MG/ML
0.5 INJECTION INTRAMUSCULAR; INTRAVENOUS; SUBCUTANEOUS
Refills: 0 | Status: DISCONTINUED | OUTPATIENT
Start: 2023-10-30 | End: 2023-10-30

## 2023-10-30 RX ORDER — LEVOTHYROXINE SODIUM 125 MCG
1 TABLET ORAL
Qty: 0 | Refills: 0 | DISCHARGE

## 2023-10-30 RX ORDER — ONDANSETRON 8 MG/1
4 TABLET, FILM COATED ORAL ONCE
Refills: 0 | Status: DISCONTINUED | OUTPATIENT
Start: 2023-10-30 | End: 2023-10-30

## 2023-10-30 RX ORDER — OXYCODONE AND ACETAMINOPHEN 5; 325 MG/1; MG/1
5-325 TABLET ORAL
Qty: 6 | Refills: 0 | Status: ACTIVE | COMMUNITY
Start: 2023-10-30 | End: 1900-01-01

## 2023-10-30 RX ORDER — ACETAMINOPHEN 500 MG
1000 TABLET ORAL ONCE
Refills: 0 | Status: DISCONTINUED | OUTPATIENT
Start: 2023-10-30 | End: 2023-10-30

## 2023-10-30 RX ADMIN — SODIUM CHLORIDE 100 MILLILITER(S): 9 INJECTION, SOLUTION INTRAVENOUS at 15:47

## 2023-10-30 NOTE — BRIEF OPERATIVE NOTE - NSICDXBRIEFPROCEDURE_GEN_ALL_CORE_FT
PROCEDURES:  Excision of benign skin lesion of axilla, greater than 4.0 cm in diameter including margins 30-Oct-2023 14:10:27  Virgilio Tyler

## 2023-10-30 NOTE — ASU DISCHARGE PLAN (ADULT/PEDIATRIC) - ASU DC SPECIAL INSTRUCTIONSFT
Shower only for next two days - no submerging in water. You may remove the clear bandage and gauze pad in two days. Please leave steri strips in place until your outpatient follow up appointment.

## 2023-10-30 NOTE — BRIEF OPERATIVE NOTE - OPERATION/FINDINGS
Superficial right axillary cyst visualized and excised w/ combination of cautery and blunt dissection. Minimal blood loss and hemostasis achieved. Deep dermis and superficial skin were closed - no acute complications.

## 2023-10-30 NOTE — ASU DISCHARGE PLAN (ADULT/PEDIATRIC) - CARE PROVIDER_API CALL
Anabella Fair  Surgery  [address] [place] [id number]  Phone: [phone]  Fax: [phone]  Follow Up Time: 2 weeks

## 2023-10-30 NOTE — CHART NOTE - NSCHARTNOTEFT_GEN_A_CORE
PACU ANESTHESIA ADMISSION NOTE      Procedure: Curettage, benign tumor, hand, phalanx    Excision of benign skin lesion of axilla, greater than 4.0 cm in diameter including margins      Post op diagnosis:  Sebaceous cyst of right axilla        ____  Intubated  TV:______       Rate: ______      FiO2: ______    _x___  Patent Airway    _x___  Full return of protective reflexes    _x___  Full recovery from anesthesia / back to baseline status    Vitals:    See anesthesia record      Mental Status:  _x___ Awake   _____ Alert   _____ Drowsy   _____ Sedated    Nausea/Vomiting:  _x___  NO       ______Yes,   See Post - Op Orders         Pain Scale (0-10):  __0___    Treatment: _x___ None    ____ See Post - Op/PCA Orders    Post - Operative Fluids:   __x__ Oral   ____ See Post - Op Orders    Plan: Discharge:   _x___Home       _____Floor     _____Critical Care    _____  Other:_________________    Comments:  No anesthesia issues or complications noted.  Discharge when criteria met.

## 2023-10-30 NOTE — ASU DISCHARGE PLAN (ADULT/PEDIATRIC) - A. DRIVE A CAR, OPERATE POWER TOOLS OR MACHINERY
PROGRESS NOTE:     Patient is a 75y old  Female who presents with a chief complaint of blood in stool (2022 17:36)      SUBJECTIVE / OVERNIGHT EVENTS:    ADDITIONAL REVIEW OF SYSTEMS:    MEDICATIONS  (STANDING):  amLODIPine   Tablet 5 milliGRAM(s) Oral daily  aspirin enteric coated 81 milliGRAM(s) Oral three times a day  dextrose 5%. 1000 milliLiter(s) (50 mL/Hr) IV Continuous <Continuous>  dextrose 5%. 1000 milliLiter(s) (100 mL/Hr) IV Continuous <Continuous>  dextrose 50% Injectable 25 Gram(s) IV Push once  dextrose 50% Injectable 12.5 Gram(s) IV Push once  dextrose 50% Injectable 25 Gram(s) IV Push once  diazepam    Tablet 5 milliGRAM(s) Oral two times a day  glucagon  Injectable 1 milliGRAM(s) IntraMuscular once  insulin glargine Injectable (LANTUS) 22 Unit(s) SubCutaneous at bedtime  insulin lispro (ADMELOG) corrective regimen sliding scale   SubCutaneous three times a day before meals  insulin lispro (ADMELOG) corrective regimen sliding scale   SubCutaneous at bedtime  loratadine 10 milliGRAM(s) Oral daily  losartan 100 milliGRAM(s) Oral daily  montelukast 10 milliGRAM(s) Oral at bedtime  pantoprazole    Tablet 40 milliGRAM(s) Oral two times a day  traZODone 100 milliGRAM(s) Oral at bedtime    MEDICATIONS  (PRN):  acetaminophen     Tablet .. 650 milliGRAM(s) Oral every 6 hours PRN Temp greater or equal to 38C (100.4F), Mild Pain (1 - 3)  dextrose Oral Gel 15 Gram(s) Oral once PRN Blood Glucose LESS THAN 70 milliGRAM(s)/deciliter  melatonin 3 milliGRAM(s) Oral at bedtime PRN Insomnia      CAPILLARY BLOOD GLUCOSE      POCT Blood Glucose.: 324 mg/dL (2022 22:06)  POCT Blood Glucose.: 161 mg/dL (2022 18:01)    I&O's Summary    2022 07:01  -  2022 07:00  --------------------------------------------------------  IN: 400 mL / OUT: 200 mL / NET: 200 mL        PHYSICAL EXAM:  Vital Signs Last 24 Hrs  T(C): 36.5 (2022 06:07), Max: 37.2 (2022 09:06)  T(F): 97.7 (2022 06:07), Max: 98.9 (2022 09:06)  HR: 83 (2022 06:07) (83 - 111)  BP: 136/76 (2022 06:07) (123/70 - 191/76)  BP(mean): --  RR: 18 (2022 06:07) (16 - 19)  SpO2: 96% (2022 06:07) (95% - 98%)    CONSTITUTIONAL: NAD, well-developed  RESPIRATORY: Normal respiratory effort; lungs are clear to auscultation bilaterally  CARDIOVASCULAR: Regular rate and rhythm, normal S1 and S2, no murmur/rub/gallop; No lower extremity edema; Peripheral pulses are 2+ bilaterally  ABDOMEN: Nontender to palpation, normoactive bowel sounds, no rebound/guarding; No hepatosplenomegaly  MUSCULOSKELETAL: no clubbing or cyanosis of digits; no joint swelling or tenderness to palpation  PSYCH: A+O to person, place, and time; affect appropriate    LABS:                        12.6   6.03  )-----------( 273      ( 2022 06:38 )             39.5     04-23    139  |  100  |  24<H>  ----------------------------<  326<H>  4.3   |  22  |  0.75    Ca    10.0      2022 10:11    TPro  7.3  /  Alb  4.9  /  TBili  0.3  /  DBili  x   /  AST  19  /  ALT  27  /  AlkPhos  133<H>  04-23          Urinalysis Basic - ( 2022 11:37 )    Color: Colorless / Appearance: Clear / S.044 / pH: x  Gluc: x / Ketone: Negative  / Bili: Negative / Urobili: Negative   Blood: x / Protein: Negative / Nitrite: Negative   Leuk Esterase: Negative / RBC: 2 /hpf / WBC 0 /HPF   Sq Epi: x / Non Sq Epi: 1 /hpf / Bacteria: Negative          RADIOLOGY & ADDITIONAL TESTS:  Results Reviewed:   Imaging Personally Reviewed:  Electrocardiogram Personally Reviewed:    COORDINATION OF CARE:  Care Discussed with Consultants/Other Providers [Y/N]:  Prior or Outpatient Records Reviewed [Y/N]:      ******************************  Authored By: Kleber Sow MD PGY1  Internal Medicine  Pager: [phone]  MS Teams  ******************************   Statement Selected PROGRESS NOTE:     Patient is a 75y old  Female who presents with a chief complaint of blood in stool (2022 17:36)      SUBJECTIVE / OVERNIGHT EVENTS: Pt had BM this morning which she described as dark - no sebas red blood, FOBT was sent (likely positive), BP stable, Hgb 13.3 on admission, 12.6 this morning; no abdominal pain, n/v/d. GI already consulted. Pt otherwise denies any pain, transitioned to clear liquid diet.  at bedside, understand plan for potential colonoscopy per GI reccs.    ADDITIONAL REVIEW OF SYSTEMS:    MEDICATIONS  (STANDING):  amLODIPine   Tablet 5 milliGRAM(s) Oral daily  aspirin enteric coated 81 milliGRAM(s) Oral three times a day  dextrose 5%. 1000 milliLiter(s) (50 mL/Hr) IV Continuous <Continuous>  dextrose 5%. 1000 milliLiter(s) (100 mL/Hr) IV Continuous <Continuous>  dextrose 50% Injectable 25 Gram(s) IV Push once  dextrose 50% Injectable 12.5 Gram(s) IV Push once  dextrose 50% Injectable 25 Gram(s) IV Push once  diazepam    Tablet 5 milliGRAM(s) Oral two times a day  glucagon  Injectable 1 milliGRAM(s) IntraMuscular once  insulin glargine Injectable (LANTUS) 22 Unit(s) SubCutaneous at bedtime  insulin lispro (ADMELOG) corrective regimen sliding scale   SubCutaneous three times a day before meals  insulin lispro (ADMELOG) corrective regimen sliding scale   SubCutaneous at bedtime  loratadine 10 milliGRAM(s) Oral daily  losartan 100 milliGRAM(s) Oral daily  montelukast 10 milliGRAM(s) Oral at bedtime  pantoprazole    Tablet 40 milliGRAM(s) Oral two times a day  traZODone 100 milliGRAM(s) Oral at bedtime    MEDICATIONS  (PRN):  acetaminophen     Tablet .. 650 milliGRAM(s) Oral every 6 hours PRN Temp greater or equal to 38C (100.4F), Mild Pain (1 - 3)  dextrose Oral Gel 15 Gram(s) Oral once PRN Blood Glucose LESS THAN 70 milliGRAM(s)/deciliter  melatonin 3 milliGRAM(s) Oral at bedtime PRN Insomnia      CAPILLARY BLOOD GLUCOSE      POCT Blood Glucose.: 324 mg/dL (2022 22:06)  POCT Blood Glucose.: 161 mg/dL (2022 18:01)    I&O's Summary    2022 07:01  -  2022 07:00  --------------------------------------------------------  IN: 400 mL / OUT: 200 mL / NET: 200 mL        PHYSICAL EXAM:  Vital Signs Last 24 Hrs  T(C): 36.5 (2022 06:07), Max: 37.2 (2022 09:06)  T(F): 97.7 (2022 06:07), Max: 98.9 (2022 09:06)  HR: 83 (2022 06:07) (83 - 111)  BP: 136/76 (2022 06:07) (123/70 - 191/76)  BP(mean): --  RR: 18 (2022 06:07) (16 - 19)  SpO2: 96% (2022 06:07) (95% - 98%)    GENERAL: NAD, lying in bed comfortably  HEAD:  Atraumatic, Normocephalic  EYES: EOMI, PERRLA, conjunctiva and sclera clear  ENT: Moist mucous membranes  NECK: Supple, No JVD  CHEST/LUNG: Clear to auscultation bilaterally; No rales, rhonchi, wheezing, or rubs. Unlabored respirations  HEART: Regular rate and rhythm; No murmurs, rubs, or gallops  ABDOMEN: BSx4; Soft, nondistended, no ttp  EXTREMITIES:  2+ Peripheral Pulses, brisk capillary refill. No clubbing, cyanosis, or edema  NERVOUS SYSTEM:  A&Ox3, no focal deficits   SKIN: No rashes or lesions  Psych: Normal speech, normal behavior, normal affect    LABS:                        12.6   6.03  )-----------( 273      ( 2022 06:38 )             39.5     04-23    139  |  100  |  24<H>  ----------------------------<  326<H>  4.3   |  22  |  0.75    Ca    10.0      2022 10:11    TPro  7.3  /  Alb  4.9  /  TBili  0.3  /  DBili  x   /  AST  19  /  ALT  27  /  AlkPhos  133<H>  04-23          Urinalysis Basic - ( 2022 11:37 )    Color: Colorless / Appearance: Clear / S.044 / pH: x  Gluc: x / Ketone: Negative  / Bili: Negative / Urobili: Negative   Blood: x / Protein: Negative / Nitrite: Negative   Leuk Esterase: Negative / RBC: 2 /hpf / WBC 0 /HPF   Sq Epi: x / Non Sq Epi: 1 /hpf / Bacteria: Negative          RADIOLOGY & ADDITIONAL TESTS:  Results Reviewed:   Imaging Personally Reviewed:  Electrocardiogram Personally Reviewed:    COORDINATION OF CARE:  Care Discussed with Consultants/Other Providers [Y/N]:  Prior or Outpatient Records Reviewed [Y/N]:      ******************************  Authored By: Kleber Sow MD PGY1  Internal Medicine  Pager: [phone]  MS Teams  ******************************

## 2023-11-06 LAB
SURGICAL PATHOLOGY STUDY: SIGNIFICANT CHANGE UP
SURGICAL PATHOLOGY STUDY: SIGNIFICANT CHANGE UP

## 2023-11-15 ENCOUNTER — APPOINTMENT (OUTPATIENT)
Dept: SURGERY | Facility: CLINIC | Age: 23
End: 2023-11-15
Payer: COMMERCIAL

## 2023-11-15 VITALS
HEIGHT: 70 IN | OXYGEN SATURATION: 99 % | BODY MASS INDEX: 30.92 KG/M2 | HEART RATE: 107 BPM | TEMPERATURE: 97.6 F | DIASTOLIC BLOOD PRESSURE: 70 MMHG | WEIGHT: 216 LBS | SYSTOLIC BLOOD PRESSURE: 112 MMHG

## 2023-11-15 PROCEDURE — 99024 POSTOP FOLLOW-UP VISIT: CPT

## 2023-11-27 ENCOUNTER — NON-APPOINTMENT (OUTPATIENT)
Age: 23
End: 2023-11-27

## 2023-12-06 PROBLEM — Z86.39 PERSONAL HISTORY OF OTHER ENDOCRINE, NUTRITIONAL AND METABOLIC DISEASE: Chronic | Status: ACTIVE | Noted: 2023-10-30

## 2023-12-08 ENCOUNTER — APPOINTMENT (OUTPATIENT)
Dept: SURGERY | Facility: CLINIC | Age: 23
End: 2023-12-08
Payer: COMMERCIAL

## 2023-12-08 VITALS
DIASTOLIC BLOOD PRESSURE: 70 MMHG | OXYGEN SATURATION: 99 % | HEIGHT: 70 IN | BODY MASS INDEX: 31.64 KG/M2 | SYSTOLIC BLOOD PRESSURE: 118 MMHG | TEMPERATURE: 97.1 F | HEART RATE: 99 BPM | WEIGHT: 221 LBS

## 2023-12-08 DIAGNOSIS — L02.419 CUTANEOUS ABSCESS OF LIMB, UNSPECIFIED: ICD-10-CM

## 2023-12-08 DIAGNOSIS — G89.18 OTHER ACUTE POSTPROCEDURAL PAIN: ICD-10-CM

## 2023-12-08 PROCEDURE — 99213 OFFICE O/P EST LOW 20 MIN: CPT

## 2024-01-02 ENCOUNTER — NON-APPOINTMENT (OUTPATIENT)
Age: 24
End: 2024-01-02

## 2024-01-05 ENCOUNTER — APPOINTMENT (OUTPATIENT)
Dept: SURGERY | Facility: CLINIC | Age: 24
End: 2024-01-05

## 2024-08-19 ENCOUNTER — OUTPATIENT (OUTPATIENT)
Dept: OUTPATIENT SERVICES | Facility: HOSPITAL | Age: 24
LOS: 1 days | End: 2024-08-19
Payer: COMMERCIAL

## 2024-08-19 DIAGNOSIS — Z00.8 ENCOUNTER FOR OTHER GENERAL EXAMINATION: ICD-10-CM

## 2024-08-19 DIAGNOSIS — M54.16 RADICULOPATHY, LUMBAR REGION: ICD-10-CM

## 2024-08-19 PROCEDURE — 76882 US LMTD JT/FCL EVL NVASC XTR: CPT | Mod: 26,LT

## 2024-08-19 PROCEDURE — 76882 US LMTD JT/FCL EVL NVASC XTR: CPT | Mod: LT

## 2024-08-19 PROCEDURE — 72110 X-RAY EXAM L-2 SPINE 4/>VWS: CPT | Mod: 26

## 2024-08-19 PROCEDURE — 72110 X-RAY EXAM L-2 SPINE 4/>VWS: CPT

## 2024-08-20 DIAGNOSIS — M54.16 RADICULOPATHY, LUMBAR REGION: ICD-10-CM

## 2024-11-22 ENCOUNTER — APPOINTMENT (OUTPATIENT)
Dept: SURGERY | Facility: CLINIC | Age: 24
End: 2024-11-22
Payer: COMMERCIAL

## 2024-11-22 VITALS
HEIGHT: 70 IN | OXYGEN SATURATION: 100 % | HEART RATE: 78 BPM | SYSTOLIC BLOOD PRESSURE: 124 MMHG | WEIGHT: 214 LBS | DIASTOLIC BLOOD PRESSURE: 80 MMHG | BODY MASS INDEX: 30.64 KG/M2

## 2024-11-22 DIAGNOSIS — L02.419 CUTANEOUS ABSCESS OF LIMB, UNSPECIFIED: ICD-10-CM

## 2024-11-22 DIAGNOSIS — F12.91 CANNABIS USE, UNSPECIFIED, IN REMISSION: ICD-10-CM

## 2024-11-22 DIAGNOSIS — E03.9 HYPOTHYROIDISM, UNSPECIFIED: ICD-10-CM

## 2024-11-22 DIAGNOSIS — D17.1 BENIGN LIPOMATOUS NEOPLASM OF SKIN AND SUBCUTANEOUS TISSUE OF TRUNK: ICD-10-CM

## 2024-11-22 PROCEDURE — 99213 OFFICE O/P EST LOW 20 MIN: CPT

## 2024-11-22 RX ORDER — LEVOTHYROXINE SODIUM 0.17 MG/1
TABLET ORAL
Refills: 0 | Status: ACTIVE | COMMUNITY

## 2024-11-25 PROBLEM — E03.9 HYPOTHYROIDISM, UNSPECIFIED TYPE: Status: ACTIVE | Noted: 2024-11-22

## 2024-11-25 PROBLEM — D17.1 LIPOMA OF LOWER BACK: Status: ACTIVE | Noted: 2024-11-25

## 2024-12-05 ENCOUNTER — NON-APPOINTMENT (OUTPATIENT)
Age: 24
End: 2024-12-05

## 2024-12-17 ENCOUNTER — APPOINTMENT (OUTPATIENT)
Dept: SURGERY | Facility: HOSPITAL | Age: 24
End: 2024-12-17

## 2024-12-17 ENCOUNTER — OUTPATIENT (OUTPATIENT)
Dept: OUTPATIENT SERVICES | Facility: HOSPITAL | Age: 24
LOS: 1 days | Discharge: HOME | End: 2024-12-17
Payer: COMMERCIAL

## 2024-12-17 ENCOUNTER — TRANSCRIPTION ENCOUNTER (OUTPATIENT)
Age: 24
End: 2024-12-17

## 2024-12-17 ENCOUNTER — RESULT REVIEW (OUTPATIENT)
Age: 24
End: 2024-12-17

## 2024-12-17 VITALS
RESPIRATION RATE: 12 BRPM | DIASTOLIC BLOOD PRESSURE: 76 MMHG | OXYGEN SATURATION: 99 % | HEART RATE: 86 BPM | TEMPERATURE: 98 F | SYSTOLIC BLOOD PRESSURE: 113 MMHG

## 2024-12-17 VITALS
HEIGHT: 70 IN | HEART RATE: 77 BPM | OXYGEN SATURATION: 100 % | RESPIRATION RATE: 16 BRPM | WEIGHT: 216.05 LBS | SYSTOLIC BLOOD PRESSURE: 125 MMHG | DIASTOLIC BLOOD PRESSURE: 63 MMHG | TEMPERATURE: 98 F

## 2024-12-17 DIAGNOSIS — D17.1 BENIGN LIPOMATOUS NEOPLASM OF SKIN AND SUBCUTANEOUS TISSUE OF TRUNK: ICD-10-CM

## 2024-12-17 PROCEDURE — 88304 TISSUE EXAM BY PATHOLOGIST: CPT

## 2024-12-17 PROCEDURE — 21930 EXC BACK LES SC < 3 CM: CPT

## 2024-12-17 PROCEDURE — 11401 EXC TR-EXT B9+MARG 0.6-1 CM: CPT

## 2024-12-17 PROCEDURE — 11402 EXC TR-EXT B9+MARG 1.1-2 CM: CPT

## 2024-12-17 PROCEDURE — 88304 TISSUE EXAM BY PATHOLOGIST: CPT | Mod: 26

## 2024-12-17 RX ORDER — HYDROMORPHONE HYDROCHLORIDE 2 MG/1
0.5 TABLET ORAL
Refills: 0 | Status: DISCONTINUED | OUTPATIENT
Start: 2024-12-17 | End: 2024-12-17

## 2024-12-17 RX ORDER — HYDROMORPHONE HYDROCHLORIDE 2 MG/1
1 TABLET ORAL
Refills: 0 | Status: DISCONTINUED | OUTPATIENT
Start: 2024-12-17 | End: 2024-12-17

## 2024-12-17 RX ORDER — ONDANSETRON HYDROCHLORIDE 4 MG/1
4 TABLET, FILM COATED ORAL ONCE
Refills: 0 | Status: DISCONTINUED | OUTPATIENT
Start: 2024-12-17 | End: 2024-12-17

## 2024-12-17 RX ORDER — ACETAMINOPHEN 500MG 500 MG/1
1000 TABLET, COATED ORAL ONCE
Refills: 0 | Status: COMPLETED | OUTPATIENT
Start: 2024-12-17 | End: 2024-12-17

## 2024-12-17 RX ORDER — LEVOTHYROXINE SODIUM 150 MCG
1 TABLET ORAL
Refills: 0 | DISCHARGE

## 2024-12-17 RX ORDER — 0.9 % SODIUM CHLORIDE 0.9 %
1000 INTRAVENOUS SOLUTION INTRAVENOUS
Refills: 0 | Status: DISCONTINUED | OUTPATIENT
Start: 2024-12-17 | End: 2024-12-17

## 2024-12-17 RX ADMIN — Medication 75 MILLILITER(S): at 10:55

## 2024-12-17 RX ADMIN — ACETAMINOPHEN 500MG 1000 MILLIGRAM(S): 500 TABLET, COATED ORAL at 08:40

## 2024-12-17 NOTE — ASU DISCHARGE PLAN (ADULT/PEDIATRIC) - FINANCIAL ASSISTANCE
[facility] provides services at a reduced cost to those who are determined to be eligible through [facility]’s financial assistance program. Information regarding [facility]’s financial assistance program can be found by going to [email or web address] or by calling [phone].

## 2024-12-17 NOTE — CHART NOTE - NSCHARTNOTEFT_GEN_A_CORE
PACU ANESTHESIA ADMISSION NOTE      Procedure: Excision of left lower back lipoma x 3, revision right axillary scar  Post op diagnosis:  Lipomas, scar    ____  Intubated  TV:______       Rate: ______      FiO2: ______    ____  Patent Airway    ____  Full return of protective reflexes    _x___  Full recovery from anesthesia / back to baseline     Vitals:   T: 97.5          R:  15                BP:   110/62               Sat:   99                P: 99      Mental Status:  _x___ Awake   _____ Alert   _____ Drowsy   _____ Sedated    Nausea/Vomiting:  x____ NO  ______Yes,   See Post - Op Orders          Pain Scale (0-10):  __0___    Treatment: ____ None    ____ See Post - Op/PCA Orders    Post - Operative Fluids:   _x___ Oral   ____ See Post - Op Orders    Plan: Discharge:   x____Home       _____Floor     _____Critical Care    _____  Other:_________________    Comments:

## 2024-12-17 NOTE — ASU PREOP CHECKLIST - 1.
patient denies anything undergown, patient denies anything undergown, Type & screen and confirmation specimens needed as per blood bank; MD, anesthesia & OR RN made aware.

## 2024-12-17 NOTE — ASU DISCHARGE PLAN (ADULT/PEDIATRIC) - CARE PROVIDER_API CALL
Anabella Fair  Surgery  [address] [place] [address], [address] [place] [address]  Phone: [phone]  Fax: [phone]  Scheduled Appointment: 01/03/2025 11:45 AM

## 2024-12-17 NOTE — ASU DISCHARGE PLAN (ADULT/PEDIATRIC) - MEDICATION INSTRUCTIONS
Take over the counter extra strength tylenol 500mg and/or ibprofen 400mg with food every 6 hours for pain instead of prescription pain meds if you do not need stronger pain control. Do not take tylenol in addition to your prescription pain med if your prescription pain med already has tylenol in it. No more than 4g of tylenol in 24hrs or 1g in 4 hrs. No mixing alcohol with prescription pain meds. No driving or operating machinery while taking prescription pain meds.

## 2024-12-17 NOTE — ASU DISCHARGE PLAN (ADULT/PEDIATRIC) - PROCEDURE
4 Back lipoma Excision and Axillary Wound Revision 5 Back lipoma Excision and Axillary Wound Revision (2cm)

## 2024-12-17 NOTE — ASU DISCHARGE PLAN (ADULT/PEDIATRIC) - NS MD DC FALL RISK RISK
For information on Fall & Injury Prevention, visit: [email or web address] OR  [email or web address] OR  https://www.cdc.gov/steadi/patient.html

## 2024-12-17 NOTE — ASU PREOP CHECKLIST - 2.
Patient alert & oriented x4, denies any contacts, jewelry, or undergarments. As per patient, all personal property/belongings left in locker. Patient denies any implants/metals/or other internal prostheses. Entered chart to time out patient

## 2024-12-18 LAB — SURGICAL PATHOLOGY STUDY: SIGNIFICANT CHANGE UP

## 2024-12-24 DIAGNOSIS — E06.3 AUTOIMMUNE THYROIDITIS: ICD-10-CM

## 2024-12-24 DIAGNOSIS — L90.5 SCAR CONDITIONS AND FIBROSIS OF SKIN: ICD-10-CM

## 2024-12-24 DIAGNOSIS — D17.1 BENIGN LIPOMATOUS NEOPLASM OF SKIN AND SUBCUTANEOUS TISSUE OF TRUNK: ICD-10-CM

## 2024-12-24 DIAGNOSIS — Z79.890 HORMONE REPLACEMENT THERAPY: ICD-10-CM

## 2024-12-24 DIAGNOSIS — E03.9 HYPOTHYROIDISM, UNSPECIFIED: ICD-10-CM

## 2024-12-24 DIAGNOSIS — Z91.018 ALLERGY TO OTHER FOODS: ICD-10-CM

## 2025-01-03 ENCOUNTER — APPOINTMENT (OUTPATIENT)
Dept: SURGERY | Facility: CLINIC | Age: 25
End: 2025-01-03
Payer: COMMERCIAL

## 2025-01-03 VITALS
HEIGHT: 70 IN | DIASTOLIC BLOOD PRESSURE: 80 MMHG | WEIGHT: 226 LBS | OXYGEN SATURATION: 100 % | SYSTOLIC BLOOD PRESSURE: 116 MMHG | HEART RATE: 80 BPM | BODY MASS INDEX: 32.35 KG/M2

## 2025-01-03 DIAGNOSIS — D17.1 BENIGN LIPOMATOUS NEOPLASM OF SKIN AND SUBCUTANEOUS TISSUE OF TRUNK: ICD-10-CM

## 2025-01-03 DIAGNOSIS — L02.419 CUTANEOUS ABSCESS OF LIMB, UNSPECIFIED: ICD-10-CM

## 2025-01-03 PROCEDURE — 99024 POSTOP FOLLOW-UP VISIT: CPT
